# Patient Record
Sex: FEMALE | Race: WHITE | NOT HISPANIC OR LATINO | Employment: OTHER | URBAN - METROPOLITAN AREA
[De-identification: names, ages, dates, MRNs, and addresses within clinical notes are randomized per-mention and may not be internally consistent; named-entity substitution may affect disease eponyms.]

---

## 2017-01-05 ENCOUNTER — COMMUNICATION - HEALTHEAST (OUTPATIENT)
Dept: FAMILY MEDICINE | Facility: CLINIC | Age: 67
End: 2017-01-05

## 2017-01-05 ENCOUNTER — RECORDS - HEALTHEAST (OUTPATIENT)
Dept: ADMINISTRATIVE | Facility: OTHER | Age: 67
End: 2017-01-05

## 2017-01-05 DIAGNOSIS — E03.9 HYPOTHYROID: ICD-10-CM

## 2017-01-05 DIAGNOSIS — I10 UNSPECIFIED ESSENTIAL HYPERTENSION: ICD-10-CM

## 2017-01-19 ENCOUNTER — RECORDS - HEALTHEAST (OUTPATIENT)
Dept: ADMINISTRATIVE | Facility: OTHER | Age: 67
End: 2017-01-19

## 2017-01-20 ENCOUNTER — COMMUNICATION - HEALTHEAST (OUTPATIENT)
Dept: SCHEDULING | Facility: CLINIC | Age: 67
End: 2017-01-20

## 2017-01-20 DIAGNOSIS — I10 UNSPECIFIED ESSENTIAL HYPERTENSION: ICD-10-CM

## 2017-01-24 ENCOUNTER — RECORDS - HEALTHEAST (OUTPATIENT)
Dept: ADMINISTRATIVE | Facility: OTHER | Age: 67
End: 2017-01-24

## 2017-01-25 ENCOUNTER — RECORDS - HEALTHEAST (OUTPATIENT)
Dept: MAMMOGRAPHY | Facility: CLINIC | Age: 67
End: 2017-01-25

## 2017-01-25 DIAGNOSIS — Z12.31 ENCOUNTER FOR SCREENING MAMMOGRAM FOR MALIGNANT NEOPLASM OF BREAST: ICD-10-CM

## 2017-04-22 ENCOUNTER — COMMUNICATION - HEALTHEAST (OUTPATIENT)
Dept: FAMILY MEDICINE | Facility: CLINIC | Age: 67
End: 2017-04-22

## 2017-04-22 DIAGNOSIS — E03.9 HYPOTHYROID: ICD-10-CM

## 2017-04-22 DIAGNOSIS — I10 UNSPECIFIED ESSENTIAL HYPERTENSION: ICD-10-CM

## 2017-06-14 ENCOUNTER — AMBULATORY - HEALTHEAST (OUTPATIENT)
Dept: FAMILY MEDICINE | Facility: CLINIC | Age: 67
End: 2017-06-14

## 2017-06-14 ENCOUNTER — AMBULATORY - HEALTHEAST (OUTPATIENT)
Dept: LAB | Facility: CLINIC | Age: 67
End: 2017-06-14

## 2017-06-14 DIAGNOSIS — E55.9 VITAMIN D INSUFFICIENCY: ICD-10-CM

## 2017-06-14 DIAGNOSIS — E78.5 HYPERLIPIDEMIA: ICD-10-CM

## 2017-06-14 LAB
CHOLEST SERPL-MCNC: 175 MG/DL
FASTING STATUS PATIENT QL REPORTED: YES
HDLC SERPL-MCNC: 71 MG/DL
LDLC SERPL CALC-MCNC: 90 MG/DL
TRIGL SERPL-MCNC: 69 MG/DL

## 2017-06-16 ENCOUNTER — COMMUNICATION - HEALTHEAST (OUTPATIENT)
Dept: PHYSICAL MEDICINE AND REHAB | Facility: CLINIC | Age: 67
End: 2017-06-16

## 2017-06-22 ENCOUNTER — COMMUNICATION - HEALTHEAST (OUTPATIENT)
Dept: FAMILY MEDICINE | Facility: CLINIC | Age: 67
End: 2017-06-22

## 2017-06-22 DIAGNOSIS — I10 UNSPECIFIED ESSENTIAL HYPERTENSION: ICD-10-CM

## 2017-06-22 DIAGNOSIS — E03.9 HYPOTHYROID: ICD-10-CM

## 2017-06-26 ENCOUNTER — COMMUNICATION - HEALTHEAST (OUTPATIENT)
Dept: FAMILY MEDICINE | Facility: CLINIC | Age: 67
End: 2017-06-26

## 2017-06-27 ENCOUNTER — COMMUNICATION - HEALTHEAST (OUTPATIENT)
Dept: FAMILY MEDICINE | Facility: CLINIC | Age: 67
End: 2017-06-27

## 2017-06-27 ENCOUNTER — AMBULATORY - HEALTHEAST (OUTPATIENT)
Dept: FAMILY MEDICINE | Facility: CLINIC | Age: 67
End: 2017-06-27

## 2017-06-27 DIAGNOSIS — E03.9 HYPOTHYROID: ICD-10-CM

## 2017-07-06 ENCOUNTER — OFFICE VISIT - HEALTHEAST (OUTPATIENT)
Dept: PODIATRY | Facility: CLINIC | Age: 67
End: 2017-07-06

## 2017-07-06 DIAGNOSIS — G58.9 COMPRESSION NEUROPATHY: ICD-10-CM

## 2017-07-06 DIAGNOSIS — G57.50 TARSAL TUNNEL SYNDROME, UNSPECIFIED LATERALITY: ICD-10-CM

## 2017-08-09 ENCOUNTER — AMBULATORY - HEALTHEAST (OUTPATIENT)
Dept: LAB | Facility: CLINIC | Age: 67
End: 2017-08-09

## 2017-08-09 DIAGNOSIS — E03.9 HYPOTHYROID: ICD-10-CM

## 2017-09-25 ENCOUNTER — AMBULATORY - HEALTHEAST (OUTPATIENT)
Dept: FAMILY MEDICINE | Facility: CLINIC | Age: 67
End: 2017-09-25

## 2017-09-25 ENCOUNTER — COMMUNICATION - HEALTHEAST (OUTPATIENT)
Dept: FAMILY MEDICINE | Facility: CLINIC | Age: 67
End: 2017-09-25

## 2017-09-25 DIAGNOSIS — E03.9 HYPOTHYROID: ICD-10-CM

## 2017-10-22 ENCOUNTER — COMMUNICATION - HEALTHEAST (OUTPATIENT)
Dept: FAMILY MEDICINE | Facility: CLINIC | Age: 67
End: 2017-10-22

## 2017-10-24 ENCOUNTER — COMMUNICATION - HEALTHEAST (OUTPATIENT)
Dept: FAMILY MEDICINE | Facility: CLINIC | Age: 67
End: 2017-10-24

## 2017-10-24 DIAGNOSIS — I10 ESSENTIAL HYPERTENSION: ICD-10-CM

## 2017-10-26 ENCOUNTER — RECORDS - HEALTHEAST (OUTPATIENT)
Dept: ADMINISTRATIVE | Facility: OTHER | Age: 67
End: 2017-10-26

## 2017-10-29 ENCOUNTER — COMMUNICATION - HEALTHEAST (OUTPATIENT)
Dept: FAMILY MEDICINE | Facility: CLINIC | Age: 67
End: 2017-10-29

## 2017-10-29 DIAGNOSIS — I10 HYPERTENSION: ICD-10-CM

## 2017-11-13 ENCOUNTER — OFFICE VISIT - HEALTHEAST (OUTPATIENT)
Dept: FAMILY MEDICINE | Facility: CLINIC | Age: 67
End: 2017-11-13

## 2017-11-13 DIAGNOSIS — M25.542 ARTHRALGIA OF BOTH HANDS: ICD-10-CM

## 2017-11-13 DIAGNOSIS — E03.9 ACQUIRED HYPOTHYROIDISM: ICD-10-CM

## 2017-11-13 DIAGNOSIS — Z51.81 THERAPEUTIC DRUG MONITORING: ICD-10-CM

## 2017-11-13 DIAGNOSIS — M25.541 ARTHRALGIA OF BOTH HANDS: ICD-10-CM

## 2017-11-13 DIAGNOSIS — G25.81 RESTLESS LEGS SYNDROME (RLS): ICD-10-CM

## 2017-11-13 DIAGNOSIS — Z72.0 TOBACCO USE: ICD-10-CM

## 2017-11-13 ASSESSMENT — MIFFLIN-ST. JEOR: SCORE: 1343.02

## 2017-12-20 ENCOUNTER — RECORDS - HEALTHEAST (OUTPATIENT)
Dept: ADMINISTRATIVE | Facility: OTHER | Age: 67
End: 2017-12-20

## 2018-01-20 ENCOUNTER — COMMUNICATION - HEALTHEAST (OUTPATIENT)
Dept: FAMILY MEDICINE | Facility: CLINIC | Age: 68
End: 2018-01-20

## 2018-01-20 DIAGNOSIS — I10 ESSENTIAL HYPERTENSION: ICD-10-CM

## 2018-01-20 DIAGNOSIS — E78.5 HLD (HYPERLIPIDEMIA): ICD-10-CM

## 2018-01-27 ENCOUNTER — COMMUNICATION - HEALTHEAST (OUTPATIENT)
Dept: FAMILY MEDICINE | Facility: CLINIC | Age: 68
End: 2018-01-27

## 2018-01-27 DIAGNOSIS — I10 HYPERTENSION: ICD-10-CM

## 2018-02-27 ENCOUNTER — COMMUNICATION - HEALTHEAST (OUTPATIENT)
Dept: FAMILY MEDICINE | Facility: CLINIC | Age: 68
End: 2018-02-27

## 2018-02-27 ENCOUNTER — OFFICE VISIT - HEALTHEAST (OUTPATIENT)
Dept: FAMILY MEDICINE | Facility: CLINIC | Age: 68
End: 2018-02-27

## 2018-02-27 DIAGNOSIS — J40 BRONCHITIS: ICD-10-CM

## 2018-02-27 DIAGNOSIS — J10.1 INFLUENZA A: ICD-10-CM

## 2018-02-27 DIAGNOSIS — J02.9 PHARYNGITIS: ICD-10-CM

## 2018-02-27 LAB
DEPRECATED S PYO AG THROAT QL EIA: NORMAL
FLUAV AG SPEC QL IA: ABNORMAL
FLUBV AG SPEC QL IA: ABNORMAL

## 2018-02-28 LAB — GROUP A STREP BY PCR: NORMAL

## 2018-04-27 ENCOUNTER — RECORDS - HEALTHEAST (OUTPATIENT)
Dept: MAMMOGRAPHY | Facility: CLINIC | Age: 68
End: 2018-04-27

## 2018-04-27 DIAGNOSIS — Z12.31 ENCOUNTER FOR SCREENING MAMMOGRAM FOR MALIGNANT NEOPLASM OF BREAST: ICD-10-CM

## 2018-04-30 ENCOUNTER — COMMUNICATION - HEALTHEAST (OUTPATIENT)
Dept: FAMILY MEDICINE | Facility: CLINIC | Age: 68
End: 2018-04-30

## 2018-04-30 DIAGNOSIS — I10 HYPERTENSION: ICD-10-CM

## 2018-06-21 ENCOUNTER — OFFICE VISIT - HEALTHEAST (OUTPATIENT)
Dept: FAMILY MEDICINE | Facility: CLINIC | Age: 68
End: 2018-06-21

## 2018-06-21 DIAGNOSIS — Z00.00 ROUTINE GENERAL MEDICAL EXAMINATION AT A HEALTH CARE FACILITY: ICD-10-CM

## 2018-06-21 DIAGNOSIS — Z13.1 SCREENING FOR DIABETES MELLITUS: ICD-10-CM

## 2018-06-21 DIAGNOSIS — Z71.6 ENCOUNTER FOR TOBACCO USE CESSATION COUNSELING: ICD-10-CM

## 2018-06-21 DIAGNOSIS — E78.2 MIXED HYPERLIPIDEMIA: ICD-10-CM

## 2018-06-21 DIAGNOSIS — N64.52 DISCHARGE FROM RIGHT NIPPLE: ICD-10-CM
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
## 2018-06-21 DIAGNOSIS — I10 ESSENTIAL HYPERTENSION: ICD-10-CM

## 2018-06-21 DIAGNOSIS — G25.81 RESTLESS LEGS SYNDROME (RLS): ICD-10-CM

## 2018-06-21 DIAGNOSIS — Z13.0 SCREENING FOR DEFICIENCY ANEMIA: ICD-10-CM

## 2018-06-21 DIAGNOSIS — E03.9 ACQUIRED HYPOTHYROIDISM: ICD-10-CM

## 2018-06-21 LAB
ALBUMIN SERPL-MCNC: 3.7 G/DL (ref 3.5–5)
ALP SERPL-CCNC: 76 U/L (ref 45–120)
ALT SERPL W P-5'-P-CCNC: 14 U/L (ref 0–45)
ANION GAP SERPL CALCULATED.3IONS-SCNC: 7 MMOL/L (ref 5–18)
AST SERPL W P-5'-P-CCNC: 16 U/L (ref 0–40)
BILIRUB SERPL-MCNC: 0.5 MG/DL (ref 0–1)
BUN SERPL-MCNC: 20 MG/DL (ref 8–22)
CALCIUM SERPL-MCNC: 10 MG/DL (ref 8.5–10.5)
CHLORIDE BLD-SCNC: 104 MMOL/L (ref 98–107)
CHOLEST SERPL-MCNC: 192 MG/DL
CO2 SERPL-SCNC: 30 MMOL/L (ref 22–31)
CREAT SERPL-MCNC: 1.03 MG/DL (ref 0.6–1.1)
ERYTHROCYTE [DISTWIDTH] IN BLOOD BY AUTOMATED COUNT: 11.9 % (ref 11–14.5)
FASTING STATUS PATIENT QL REPORTED: YES
GFR SERPL CREATININE-BSD FRML MDRD: 53 ML/MIN/1.73M2
GLUCOSE BLD-MCNC: 93 MG/DL (ref 70–125)
HCT VFR BLD AUTO: 37.5 % (ref 35–47)
HDLC SERPL-MCNC: 74 MG/DL
HGB BLD-MCNC: 12.5 G/DL (ref 12–16)
LDLC SERPL CALC-MCNC: 101 MG/DL
MCH RBC QN AUTO: 31.5 PG (ref 27–34)
MCHC RBC AUTO-ENTMCNC: 33.5 G/DL (ref 32–36)
MCV RBC AUTO: 94 FL (ref 80–100)
PLATELET # BLD AUTO: 269 THOU/UL (ref 140–440)
PMV BLD AUTO: 6.8 FL (ref 7–10)
POTASSIUM BLD-SCNC: 4.1 MMOL/L (ref 3.5–5)
PROT SERPL-MCNC: 6.6 G/DL (ref 6–8)
RBC # BLD AUTO: 3.98 MILL/UL (ref 3.8–5.4)
SODIUM SERPL-SCNC: 141 MMOL/L (ref 136–145)
TRIGL SERPL-MCNC: 85 MG/DL
TSH SERPL DL<=0.005 MIU/L-ACNC: 1.5 UIU/ML (ref 0.3–5)
WBC: 5.4 THOU/UL (ref 4–11)

## 2018-06-21 ASSESSMENT — MIFFLIN-ST. JEOR: SCORE: 1328.96

## 2018-06-26 ENCOUNTER — HOSPITAL ENCOUNTER (OUTPATIENT)
Dept: SURGERY | Facility: CLINIC | Age: 68
Discharge: HOME | End: 2018-06-26
Attending: NURSE PRACTITIONER | Admitting: SPECIALIST

## 2018-06-26 DIAGNOSIS — N64.52 DISCHARGE FROM RIGHT NIPPLE: ICD-10-CM

## 2018-06-26 ASSESSMENT — MIFFLIN-ST. JEOR: SCORE: 1319.21

## 2018-06-29 LAB
LAB AP CHARGES (HE HISTORICAL CONVERSION): NORMAL
PATH REPORT.COMMENTS IMP SPEC: NORMAL
PATH REPORT.FINAL DX SPEC: NORMAL
PATH REPORT.GROSS SPEC: NORMAL
PATH REPORT.MICROSCOPIC SPEC OTHER STN: NORMAL
PATH REPORT.RELEVANT HX SPEC: NORMAL
RESULT FLAG (HE HISTORICAL CONVERSION): NORMAL

## 2018-07-26 ENCOUNTER — COMMUNICATION - HEALTHEAST (OUTPATIENT)
Dept: FAMILY MEDICINE | Facility: CLINIC | Age: 68
End: 2018-07-26

## 2018-07-26 DIAGNOSIS — I10 ESSENTIAL HYPERTENSION: ICD-10-CM

## 2018-07-26 DIAGNOSIS — E78.5 HLD (HYPERLIPIDEMIA): ICD-10-CM

## 2018-07-28 ENCOUNTER — COMMUNICATION - HEALTHEAST (OUTPATIENT)
Dept: FAMILY MEDICINE | Facility: CLINIC | Age: 68
End: 2018-07-28

## 2018-07-28 DIAGNOSIS — I10 HYPERTENSION: ICD-10-CM

## 2018-09-11 ENCOUNTER — OFFICE VISIT - HEALTHEAST (OUTPATIENT)
Dept: FAMILY MEDICINE | Facility: CLINIC | Age: 68
End: 2018-09-11

## 2018-09-11 DIAGNOSIS — E78.2 MIXED HYPERLIPIDEMIA: ICD-10-CM

## 2018-09-11 DIAGNOSIS — Z87.891 FORMER SMOKER: ICD-10-CM

## 2018-09-11 DIAGNOSIS — I10 ESSENTIAL HYPERTENSION: ICD-10-CM

## 2018-09-11 DIAGNOSIS — M19.079: ICD-10-CM

## 2018-09-11 DIAGNOSIS — R43.8 BAD TASTE IN MOUTH: ICD-10-CM

## 2018-09-11 DIAGNOSIS — R06.2 WHEEZE: ICD-10-CM

## 2018-09-11 DIAGNOSIS — R11.0 NAUSEA: ICD-10-CM

## 2018-09-11 DIAGNOSIS — G25.81 RESTLESS LEGS SYNDROME (RLS): ICD-10-CM

## 2018-09-11 DIAGNOSIS — E03.9 ACQUIRED HYPOTHYROIDISM: ICD-10-CM

## 2018-09-11 DIAGNOSIS — R05.9 COUGH: ICD-10-CM

## 2018-09-11 LAB
ALBUMIN SERPL-MCNC: 3.9 G/DL (ref 3.5–5)
ALP SERPL-CCNC: 84 U/L (ref 45–120)
ALT SERPL W P-5'-P-CCNC: 18 U/L (ref 0–45)
AMYLASE SERPL-CCNC: 64 U/L (ref 5–120)
AST SERPL W P-5'-P-CCNC: 17 U/L (ref 0–40)
BILIRUB DIRECT SERPL-MCNC: 0.1 MG/DL
BILIRUB SERPL-MCNC: 0.4 MG/DL (ref 0–1)
LIPASE SERPL-CCNC: 54 U/L (ref 0–52)
PROT SERPL-MCNC: 7 G/DL (ref 6–8)
TSH SERPL DL<=0.005 MIU/L-ACNC: 0.65 UIU/ML (ref 0.3–5)

## 2018-09-11 RX ORDER — ALBUTEROL SULFATE 90 UG/1
2 AEROSOL, METERED RESPIRATORY (INHALATION) EVERY 6 HOURS PRN
Qty: 1 EACH | Refills: 0 | Status: SHIPPED | OUTPATIENT
Start: 2018-09-11

## 2018-09-11 RX ORDER — GABAPENTIN 300 MG/1
CAPSULE ORAL
Qty: 120 CAPSULE | Refills: 6 | Status: SHIPPED | OUTPATIENT
Start: 2018-09-11

## 2018-09-11 ASSESSMENT — MIFFLIN-ST. JEOR: SCORE: 1302.88

## 2018-09-17 ENCOUNTER — COMMUNICATION - HEALTHEAST (OUTPATIENT)
Dept: FAMILY MEDICINE | Facility: CLINIC | Age: 68
End: 2018-09-17

## 2018-09-20 ENCOUNTER — COMMUNICATION - HEALTHEAST (OUTPATIENT)
Dept: FAMILY MEDICINE | Facility: CLINIC | Age: 68
End: 2018-09-20

## 2018-10-16 ENCOUNTER — COMMUNICATION - HEALTHEAST (OUTPATIENT)
Dept: FAMILY MEDICINE | Facility: CLINIC | Age: 68
End: 2018-10-16

## 2018-10-16 DIAGNOSIS — E03.9 HYPOTHYROIDISM, UNSPECIFIED TYPE: ICD-10-CM

## 2018-10-16 RX ORDER — LEVOTHYROXINE SODIUM 100 UG/1
100 TABLET ORAL DAILY
Qty: 90 TABLET | Refills: 3 | Status: SHIPPED | OUTPATIENT
Start: 2018-10-16

## 2018-10-19 ENCOUNTER — OFFICE VISIT - HEALTHEAST (OUTPATIENT)
Dept: FAMILY MEDICINE | Facility: CLINIC | Age: 68
End: 2018-10-19

## 2018-10-19 DIAGNOSIS — J98.01 ACUTE BRONCHOSPASM: ICD-10-CM

## 2018-10-22 ENCOUNTER — COMMUNICATION - HEALTHEAST (OUTPATIENT)
Dept: FAMILY MEDICINE | Facility: CLINIC | Age: 68
End: 2018-10-22

## 2018-10-27 ENCOUNTER — COMMUNICATION - HEALTHEAST (OUTPATIENT)
Dept: FAMILY MEDICINE | Facility: CLINIC | Age: 68
End: 2018-10-27

## 2018-12-08 ENCOUNTER — COMMUNICATION - HEALTHEAST (OUTPATIENT)
Dept: FAMILY MEDICINE | Facility: CLINIC | Age: 68
End: 2018-12-08

## 2018-12-10 RX ORDER — BECLOMETHASONE DIPROPIONATE HFA 80 UG/1
AEROSOL, METERED RESPIRATORY (INHALATION)
Qty: 10.6 G | Refills: 0 | Status: SHIPPED | OUTPATIENT
Start: 2018-12-10

## 2019-01-24 ENCOUNTER — OFFICE VISIT - HEALTHEAST (OUTPATIENT)
Dept: FAMILY MEDICINE | Facility: CLINIC | Age: 69
End: 2019-01-24

## 2019-01-24 DIAGNOSIS — M19.071 PRIMARY OSTEOARTHRITIS OF RIGHT FOOT: ICD-10-CM

## 2019-01-24 DIAGNOSIS — G25.81 RESTLESS LEGS SYNDROME (RLS): ICD-10-CM

## 2019-01-24 NOTE — ASSESSMENT & PLAN NOTE
Previously prescribed Mirapex by [facility] and dose increased  States that this was not helpful  Now is on gabapentin, she understands for restless legs  Still having symptoms    I suggested that her symptoms sound most compatible with RLS rather than neuropathy (apparently borderline EMG) and that bilateral, symmetric, foot symptoms would be unlikely from lumbar spine disease, though not impossible    She has diminished reflexes which would seem to make upper motor neuron problem very unlikely    I think a trial of alternative Parkinson agent is appropriate, discussed this at length, start ropinirole 0.25 mg, go to 5 mg after a few days.  Discussed that with this medication, unlike gabapentin, and she could use it as needed    In the meantime, since it seems it is not helping anything, she should gradually wean off gabapentin over 1 month.  Patient expressed understanding

## 2019-02-03 ENCOUNTER — COMMUNICATION - HEALTHEAST (OUTPATIENT)
Dept: FAMILY MEDICINE | Facility: CLINIC | Age: 69
End: 2019-02-03

## 2019-02-03 DIAGNOSIS — Z71.6 ENCOUNTER FOR TOBACCO USE CESSATION COUNSELING: ICD-10-CM

## 2019-02-06 RX ORDER — VARENICLINE TARTRATE 1 MG/1
TABLET, FILM COATED ORAL
Qty: 112 TABLET | Refills: 2 | Status: SHIPPED | OUTPATIENT
Start: 2019-02-06

## 2019-02-18 ENCOUNTER — COMMUNICATION - HEALTHEAST (OUTPATIENT)
Dept: FAMILY MEDICINE | Facility: CLINIC | Age: 69
End: 2019-02-18

## 2019-03-29 ENCOUNTER — COMMUNICATION - HEALTHEAST (OUTPATIENT)
Dept: FAMILY MEDICINE | Facility: CLINIC | Age: 69
End: 2019-03-29

## 2019-03-29 DIAGNOSIS — G25.81 RESTLESS LEGS SYNDROME (RLS): ICD-10-CM

## 2019-05-23 ENCOUNTER — COMMUNICATION - HEALTHEAST (OUTPATIENT)
Dept: FAMILY MEDICINE | Facility: CLINIC | Age: 69
End: 2019-05-23

## 2019-06-29 ENCOUNTER — COMMUNICATION - HEALTHEAST (OUTPATIENT)
Dept: FAMILY MEDICINE | Facility: CLINIC | Age: 69
End: 2019-06-29

## 2019-06-29 DIAGNOSIS — G25.81 RESTLESS LEGS SYNDROME (RLS): ICD-10-CM

## 2019-06-29 RX ORDER — ROPINIROLE 0.25 MG/1
TABLET, FILM COATED ORAL
Qty: 180 TABLET | Refills: 1 | Status: SHIPPED | OUTPATIENT
Start: 2019-06-29

## 2019-07-24 ENCOUNTER — COMMUNICATION - HEALTHEAST (OUTPATIENT)
Dept: FAMILY MEDICINE | Facility: CLINIC | Age: 69
End: 2019-07-24

## 2019-07-24 DIAGNOSIS — I10 ESSENTIAL HYPERTENSION: ICD-10-CM

## 2019-07-24 DIAGNOSIS — E78.5 HLD (HYPERLIPIDEMIA): ICD-10-CM

## 2019-07-29 ENCOUNTER — COMMUNICATION - HEALTHEAST (OUTPATIENT)
Dept: FAMILY MEDICINE | Facility: CLINIC | Age: 69
End: 2019-07-29

## 2019-07-29 DIAGNOSIS — I10 HYPERTENSION: ICD-10-CM

## 2019-07-30 RX ORDER — HYDROCHLOROTHIAZIDE 25 MG/1
25 TABLET ORAL DAILY
Qty: 90 TABLET | Refills: 0 | Status: SHIPPED | OUTPATIENT
Start: 2019-07-30

## 2019-10-19 ENCOUNTER — COMMUNICATION - HEALTHEAST (OUTPATIENT)
Dept: FAMILY MEDICINE | Facility: CLINIC | Age: 69
End: 2019-10-19

## 2019-10-19 DIAGNOSIS — E03.9 HYPOTHYROIDISM, UNSPECIFIED TYPE: ICD-10-CM

## 2019-10-21 ENCOUNTER — COMMUNICATION - HEALTHEAST (OUTPATIENT)
Dept: FAMILY MEDICINE | Facility: CLINIC | Age: 69
End: 2019-10-21

## 2019-10-21 DIAGNOSIS — I10 ESSENTIAL HYPERTENSION: ICD-10-CM

## 2020-01-13 ENCOUNTER — COMMUNICATION - HEALTHEAST (OUTPATIENT)
Dept: FAMILY MEDICINE | Facility: CLINIC | Age: 70
End: 2020-01-13

## 2020-01-13 DIAGNOSIS — E78.5 HLD (HYPERLIPIDEMIA): ICD-10-CM

## 2020-01-13 DIAGNOSIS — I10 ESSENTIAL HYPERTENSION: ICD-10-CM

## 2020-01-17 ENCOUNTER — RECORDS - HEALTHEAST (OUTPATIENT)
Dept: MAMMOGRAPHY | Facility: CLINIC | Age: 70
End: 2020-01-17

## 2020-01-17 DIAGNOSIS — Z12.31 ENCOUNTER FOR SCREENING MAMMOGRAM FOR MALIGNANT NEOPLASM OF BREAST: ICD-10-CM

## 2020-03-27 ENCOUNTER — COMMUNICATION - HEALTHEAST (OUTPATIENT)
Dept: FAMILY MEDICINE | Facility: CLINIC | Age: 70
End: 2020-03-27

## 2020-03-27 DIAGNOSIS — G25.81 RESTLESS LEGS SYNDROME (RLS): ICD-10-CM

## 2020-07-08 ENCOUNTER — COMMUNICATION - HEALTHEAST (OUTPATIENT)
Dept: FAMILY MEDICINE | Facility: CLINIC | Age: 70
End: 2020-07-08

## 2020-07-08 DIAGNOSIS — I10 ESSENTIAL HYPERTENSION: ICD-10-CM

## 2020-07-08 DIAGNOSIS — E78.5 HLD (HYPERLIPIDEMIA): ICD-10-CM

## 2020-07-09 ENCOUNTER — COMMUNICATION - HEALTHEAST (OUTPATIENT)
Dept: FAMILY MEDICINE | Facility: CLINIC | Age: 70
End: 2020-07-09

## 2021-05-24 ENCOUNTER — RECORDS - HEALTHEAST (OUTPATIENT)
Dept: ADMINISTRATIVE | Facility: CLINIC | Age: 71
End: 2021-05-24

## 2021-05-25 ENCOUNTER — RECORDS - HEALTHEAST (OUTPATIENT)
Dept: ADMINISTRATIVE | Facility: CLINIC | Age: 71
End: 2021-05-25

## 2021-05-27 ENCOUNTER — RECORDS - HEALTHEAST (OUTPATIENT)
Dept: ADMINISTRATIVE | Facility: CLINIC | Age: 71
End: 2021-05-27

## 2021-05-27 NOTE — TELEPHONE ENCOUNTER
Refill Approved    Rx renewed per Medication Renewal Policy. Medication was last renewed on 01/24/2019, last office visit 1/24/2019.    Danika Landis, [facility] Connection Triage/Med Refill 3/29/2019     Requested Prescriptions   Pending Prescriptions Disp Refills     rOPINIRole (REQUIP) 0.25 MG tablet [Pharmacy Med Name: ROPINIROLE HCL 0.25 MG TABLET] 60 tablet 1     Sig: TAKE 1 TABLET BY MOUTH NIGHTLY FOR 3 DAYS, THEN 2 TABLETS BY MOUTH AT SLEEP TIME AS NEEDED    Parkinson's Meds I Refill Protocol Passed - 3/29/2019  3:39 AM       Passed - PCP or prescribing provider visit in past 6 months     Last office visit with prescriber/PCP: 1/24/2019 OR same dept: 1/24/2019 Edgardo Hamm MD OR same specialty: 1/24/2019 Edgardo Hamm MD Last physical: Visit date not found Last MTM visit: Visit date not found     Next appt within 3 mo: Visit date not found  Next physical within 3 mo: Visit date not found  Prescriber OR PCP: Edgardo Hamm MD  Last diagnosis associated with med order: There are no diagnoses linked to this encounter.  If protocol passes may refill for 6 months if within 3 months of last provider visit (or a total of 9 months).

## 2021-05-28 ENCOUNTER — RECORDS - HEALTHEAST (OUTPATIENT)
Dept: ADMINISTRATIVE | Facility: CLINIC | Age: 71
End: 2021-05-28

## 2021-05-29 ENCOUNTER — RECORDS - HEALTHEAST (OUTPATIENT)
Dept: ADMINISTRATIVE | Facility: CLINIC | Age: 71
End: 2021-05-29

## 2021-05-29 NOTE — TELEPHONE ENCOUNTER
Called patient to schedule an appt for a follow up with HTN. Spoke with pt. Patient states she is switching PCP, and have not found a doctor she is comfortable with at the moment. She states she is still looking. No appt is scheduled with anyone.

## 2021-05-30 ENCOUNTER — RECORDS - HEALTHEAST (OUTPATIENT)
Dept: ADMINISTRATIVE | Facility: CLINIC | Age: 71
End: 2021-05-30

## 2021-05-30 NOTE — TELEPHONE ENCOUNTER
RN cannot approve Refill Request    RN can NOT refill this medication Protocol failed and NO refill given.       Marybeth Johnston, [facility] Triage/Med Refill 7/24/2019    Requested Prescriptions   Pending Prescriptions Disp Refills     lisinopril (PRINIVIL,ZESTRIL) 40 MG tablet [Pharmacy Med Name: LISINOPRIL 40 MG TABLET] 90 tablet 3     Sig: TAKE 1 TABLET BY MOUTH EVERY DAY       Ace Inhibitors Refill Protocol Failed - 7/24/2019  1:38 AM        Failed - Serum Potassium in past 12 months     No results found for: LN-POTASSIUM          Failed - Serum Creatinine in past 12 months     Creatinine   Date Value Ref Range Status   06/21/2018 1.03 0.60 - 1.10 mg/dL Final             Passed - PCP or prescribing provider visit in past 12 months       Last office visit with prescriber/PCP: Visit date not found OR same dept: 10/19/2018 Almaz Raygoza MD OR same specialty: 1/24/2019 Edgardo Hamm MD  Last physical: 6/21/2018 Last MTM visit: Visit date not found   Next visit within 3 mo: Visit date not found  Next physical within 3 mo: Visit date not found  Prescriber OR PCP: Annita Patton CNP  Last diagnosis associated with med order: 1. HLD (hyperlipidemia)  - atorvastatin (LIPITOR) 40 MG tablet; TAKE 1 TABLET BY MOUTH EVERY DAY  Dispense: 90 tablet; Refill: 1    2. Essential hypertension  - lisinopril (PRINIVIL,ZESTRIL) 40 MG tablet [Pharmacy Med Name: LISINOPRIL 40 MG TABLET]; TAKE 1 TABLET BY MOUTH EVERY DAY  Dispense: 90 tablet; Refill: 3    If protocol passes may refill for 12 months if within 3 months of last provider visit (or a total of 15 months).             Passed - Blood pressure filed in past 12 months     BP Readings from Last 1 Encounters:   01/24/19 108/68           Signed Prescriptions Disp Refills    atorvastatin (LIPITOR) 40 MG tablet 90 tablet 1     Sig: TAKE 1 TABLET BY MOUTH EVERY DAY       Statins Refill Protocol (Hmg CoA Reductase Inhibitors) Passed - 7/24/2019  1:38 AM         Passed - PCP or prescribing provider visit in past 12 months      Last office visit with prescriber/PCP: Visit date not found OR same dept: 10/19/2018 Almaz Raygoza MD OR same specialty: 1/24/2019 Edgardo Hamm MD  Last physical: 6/21/2018 Last MTM visit: Visit date not found   Next visit within 3 mo: Visit date not found  Next physical within 3 mo: Visit date not found  Prescriber OR PCP: Annita Patton CNP  Last diagnosis associated with med order: 1. HLD (hyperlipidemia)  - atorvastatin (LIPITOR) 40 MG tablet; TAKE 1 TABLET BY MOUTH EVERY DAY  Dispense: 90 tablet; Refill: 1    2. Essential hypertension  - lisinopril (PRINIVIL,ZESTRIL) 40 MG tablet [Pharmacy Med Name: LISINOPRIL 40 MG TABLET]; TAKE 1 TABLET BY MOUTH EVERY DAY  Dispense: 90 tablet; Refill: 3    If protocol passes may refill for 12 months if within 3 months of last provider visit (or a total of 15 months).

## 2021-05-30 NOTE — TELEPHONE ENCOUNTER
Refill Approved    Rx renewed per Medication Renewal Policy. Medication was last renewed on 3/29/19.    Marybeth Johnston, [facility] Connection Triage/Med Refill 6/29/2019     Requested Prescriptions   Pending Prescriptions Disp Refills     rOPINIRole (REQUIP) 0.25 MG tablet [Pharmacy Med Name: ROPINIROLE HCL 0.25 MG TABLET] 90 tablet 1     Sig: TAKE 1 TABLET BY MOUTH NIGHTLY FOR 3 DAYS, THEN 2 TABLETS BY MOUTH AT SLEEP TIME AS NEEDED       Parkinson's Meds I Refill Protocol Passed - 6/29/2019  8:27 AM        Passed - PCP or prescribing provider visit in past 6 months      Last office visit with prescriber/PCP: 1/24/2019 OR same dept: 1/24/2019 Edgardo Hamm MD OR same specialty: 1/24/2019 Edgardo Hamm MD Last physical: Visit date not found Last MTM visit: Visit date not found     Next appt within 3 mo: Visit date not found  Next physical within 3 mo: Visit date not found  Prescriber OR PCP: Edgardo Hamm MD  Last diagnosis associated with med order: 1. Restless legs syndrome (RLS)  - rOPINIRole (REQUIP) 0.25 MG tablet [Pharmacy Med Name: ROPINIROLE HCL 0.25 MG TABLET]; TAKE 1 TABLET BY MOUTH NIGHTLY FOR 3 DAYS, THEN 2 TABLETS BY MOUTH AT SLEEP TIME AS NEEDED  Dispense: 90 tablet; Refill: 1    If protocol passes may refill for 6 months if within 3 months of last provider visit (or a total of 9 months).

## 2021-05-30 NOTE — TELEPHONE ENCOUNTER
RN cannot approve Refill Request    RN can NOT refill this medication PCP messaged that patient is overdue for Labs. Last office visit: 9/11/2018 Jerri Cain MD Last Physical: Visit date not found Last MTM visit: Visit date not found Last visit same specialty: 1/24/2019 Edgardo Hamm MD.  Next visit within 3 mo: Visit date not found  Next physical within 3 mo: Visit date not found      Mary Barroso, [facility] Triage/Med Refill 7/29/2019    Requested Prescriptions   Pending Prescriptions Disp Refills     hydroCHLOROthiazide (HYDRODIURIL) 25 MG tablet 90 tablet 3     Sig: Take 1 tablet (25 mg total) by mouth daily.       Diuretics/Combination Diuretics Refill Protocol  Failed - 7/29/2019 11:24 AM        Failed - Serum Potassium in past 12 months      No results found for: LN-POTASSIUM          Failed - Serum Sodium in past 12 months      No results found for: LN-SODIUM          Failed - Serum Creatinine in past 12 months      Creatinine   Date Value Ref Range Status   06/21/2018 1.03 0.60 - 1.10 mg/dL Final             Passed - Visit with PCP or prescribing provider visit in past 12 months     Last office visit with prescriber/PCP: 9/11/2018 Jerri Cain MD OR same dept: 9/11/2018 Jerri Cain MD OR same specialty: 1/24/2019 Edgardo Hamm MD  Last physical: Visit date not found Last MTM visit: Visit date not found   Next visit within 3 mo: Visit date not found  Next physical within 3 mo: Visit date not found  Prescriber OR PCP: Jerri Cain MD  Last diagnosis associated with med order: 1. Hypertension  - hydroCHLOROthiazide (HYDRODIURIL) 25 MG tablet; Take 1 tablet (25 mg total) by mouth daily.  Dispense: 90 tablet; Refill: 3    If protocol passes may refill for 12 months if within 3 months of last provider visit (or a total of 15 months).             Passed - Blood pressure on file in past 12 months     BP Readings from Last 1 Encounters:   01/24/19 108/68

## 2021-05-30 NOTE — TELEPHONE ENCOUNTER
Refill Approved    Rx renewed per Medication Renewal Policy. Medication was last renewed on 7/26/18.    Marybeth Johnston, [facility] Triage/Med Refill 7/24/2019     Requested Prescriptions   Pending Prescriptions Disp Refills     atorvastatin (LIPITOR) 40 MG tablet [Pharmacy Med Name: ATORVASTATIN 40 MG TABLET] 90 tablet 3     Sig: TAKE 1 TABLET BY MOUTH EVERY DAY       Statins Refill Protocol (Hmg CoA Reductase Inhibitors) Passed - 7/24/2019  1:38 AM        Passed - PCP or prescribing provider visit in past 12 months      Last office visit with prescriber/PCP: Visit date not found OR same dept: 10/19/2018 Almaz Raygoza MD OR same specialty: 1/24/2019 Edgardo Hamm MD  Last physical: 6/21/2018 Last MTM visit: Visit date not found   Next visit within 3 mo: Visit date not found  Next physical within 3 mo: Visit date not found  Prescriber OR PCP: Annita Patton CNP  Last diagnosis associated with med order: 1. HLD (hyperlipidemia)  - atorvastatin (LIPITOR) 40 MG tablet [Pharmacy Med Name: ATORVASTATIN 40 MG TABLET]; TAKE 1 TABLET BY MOUTH EVERY DAY  Dispense: 90 tablet; Refill: 3    2. Essential hypertension  - lisinopril (PRINIVIL,ZESTRIL) 40 MG tablet [Pharmacy Med Name: LISINOPRIL 40 MG TABLET]; TAKE 1 TABLET BY MOUTH EVERY DAY  Dispense: 90 tablet; Refill: 3    If protocol passes may refill for 12 months if within 3 months of last provider visit (or a total of 15 months).             lisinopril (PRINIVIL,ZESTRIL) 40 MG tablet [Pharmacy Med Name: LISINOPRIL 40 MG TABLET] 90 tablet 3     Sig: TAKE 1 TABLET BY MOUTH EVERY DAY       Ace Inhibitors Refill Protocol Failed - 7/24/2019  1:38 AM        Failed - Serum Potassium in past 12 months     No results found for: LN-POTASSIUM          Failed - Serum Creatinine in past 12 months     Creatinine   Date Value Ref Range Status   06/21/2018 1.03 0.60 - 1.10 mg/dL Final             Passed - PCP or prescribing provider visit in past 12 months        Last office visit with prescriber/PCP: Visit date not found OR same dept: 10/19/2018 Almaz Raygoza MD OR same specialty: 1/24/2019 Edgardo Hamm MD  Last physical: 6/21/2018 Last MTM visit: Visit date not found   Next visit within 3 mo: Visit date not found  Next physical within 3 mo: Visit date not found  Prescriber OR PCP: Annita Patton CNP  Last diagnosis associated with med order: 1. HLD (hyperlipidemia)  - atorvastatin (LIPITOR) 40 MG tablet [Pharmacy Med Name: ATORVASTATIN 40 MG TABLET]; TAKE 1 TABLET BY MOUTH EVERY DAY  Dispense: 90 tablet; Refill: 3    2. Essential hypertension  - lisinopril (PRINIVIL,ZESTRIL) 40 MG tablet [Pharmacy Med Name: LISINOPRIL 40 MG TABLET]; TAKE 1 TABLET BY MOUTH EVERY DAY  Dispense: 90 tablet; Refill: 3    If protocol passes may refill for 12 months if within 3 months of last provider visit (or a total of 15 months).             Passed - Blood pressure filed in past 12 months     BP Readings from Last 1 Encounters:   01/24/19 108/68

## 2021-05-31 ENCOUNTER — RECORDS - HEALTHEAST (OUTPATIENT)
Dept: ADMINISTRATIVE | Facility: CLINIC | Age: 71
End: 2021-05-31

## 2021-05-31 VITALS — WEIGHT: 176 LBS | BODY MASS INDEX: 27.62 KG/M2 | HEIGHT: 67 IN

## 2021-06-01 ENCOUNTER — RECORDS - HEALTHEAST (OUTPATIENT)
Dept: ADMINISTRATIVE | Facility: CLINIC | Age: 71
End: 2021-06-01

## 2021-06-01 VITALS — BODY MASS INDEX: 28.35 KG/M2 | WEIGHT: 176.4 LBS | HEIGHT: 66 IN

## 2021-06-01 VITALS — HEIGHT: 65 IN | WEIGHT: 176 LBS | BODY MASS INDEX: 29.32 KG/M2

## 2021-06-01 VITALS — BODY MASS INDEX: 28.14 KG/M2 | WEIGHT: 177 LBS

## 2021-06-02 ENCOUNTER — RECORDS - HEALTHEAST (OUTPATIENT)
Dept: ADMINISTRATIVE | Facility: CLINIC | Age: 71
End: 2021-06-02

## 2021-06-02 VITALS — BODY MASS INDEX: 28.71 KG/M2 | WEIGHT: 172.5 LBS

## 2021-06-02 VITALS — BODY MASS INDEX: 28.72 KG/M2 | HEIGHT: 65 IN | WEIGHT: 172.4 LBS

## 2021-06-02 VITALS — BODY MASS INDEX: 28.87 KG/M2 | WEIGHT: 173.5 LBS

## 2021-06-02 NOTE — TELEPHONE ENCOUNTER
RN cannot approve Refill Request    RN can NOT refill this medication Protocol failed and NO refill given. Last office visit: 9/11/2018 Jerri Cain MD Last Physical: Visit date not found Last MTM visit: Visit date not found Last visit same specialty: 1/24/2019 Edgardo Hamm MD.  Next visit within 3 mo: Visit date not found  Next physical within 3 mo: Visit date not found      Ivania Gisbon, [facility] Triage/Med Refill 10/19/2019    Requested Prescriptions   Pending Prescriptions Disp Refills     levothyroxine (SYNTHROID, LEVOTHROID) 100 MCG tablet [Pharmacy Med Name: LEVOTHYROXINE 100 MCG TABLET] 90 tablet 3     Sig: TAKE 1 TABLET BY MOUTH EVERY DAY       Thyroid Hormones Protocol Failed - 10/19/2019  8:45 AM        Failed - Provider visit in past 12 months or next 3 months     Last office visit with prescriber/PCP: 9/11/2018 Jerri Cain MD OR same dept: 10/19/2018 Almaz Raygoza MD OR same specialty: 1/24/2019 Edgardo Hamm MD  Last physical: Visit date not found Last MTM visit: Visit date not found   Next visit within 3 mo: Visit date not found  Next physical within 3 mo: Visit date not found  Prescriber OR PCP: Jerri Cain MD  Last diagnosis associated with med order: 1. Hypothyroidism, unspecified type  - levothyroxine (SYNTHROID, LEVOTHROID) 100 MCG tablet [Pharmacy Med Name: LEVOTHYROXINE 100 MCG TABLET]; TAKE 1 TABLET BY MOUTH EVERY DAY  Dispense: 90 tablet; Refill: 3    If protocol passes may refill for 12 months if within 3 months of last provider visit (or a total of 15 months).             Failed - TSH on file in past 12 months for patient age 12 & older     TSH   Date Value Ref Range Status   09/11/2018 0.65 0.30 - 5.00 uIU/mL Final

## 2021-06-02 NOTE — TELEPHONE ENCOUNTER
RN cannot approve Refill Request    RN can NOT refill this medication Protocol failed and NO refill given.        Marybeth Johnston, [facility] Triage/Med Refill 10/21/2019    Requested Prescriptions   Pending Prescriptions Disp Refills     lisinopril (PRINIVIL,ZESTRIL) 40 MG tablet [Pharmacy Med Name: LISINOPRIL 40 MG TABLET] 90 tablet 3     Sig: TAKE 1 TABLET BY MOUTH EVERY DAY       Ace Inhibitors Refill Protocol Failed - 10/21/2019  1:54 AM        Failed - PCP or prescribing provider visit in past 12 months       Last office visit with prescriber/PCP: 9/11/2018 Jerri Cain MD OR same dept: Visit date not found OR same specialty: 1/24/2019 Edgardo Hamm MD  Last physical: Visit date not found Last MTM visit: Visit date not found   Next visit within 3 mo: Visit date not found  Next physical within 3 mo: Visit date not found  Prescriber OR PCP: Jerri Cain MD  Last diagnosis associated with med order: 1. Essential hypertension  - lisinopril (PRINIVIL,ZESTRIL) 40 MG tablet [Pharmacy Med Name: LISINOPRIL 40 MG TABLET]; TAKE 1 TABLET BY MOUTH EVERY DAY  Dispense: 90 tablet; Refill: 0    If protocol passes may refill for 12 months if within 3 months of last provider visit (or a total of 15 months).             Failed - Serum Potassium in past 12 months     No results found for: LN-POTASSIUM          Failed - Serum Creatinine in past 12 months     Creatinine   Date Value Ref Range Status   06/21/2018 1.03 0.60 - 1.10 mg/dL Final             Passed - Blood pressure filed in past 12 months     BP Readings from Last 1 Encounters:   01/24/19 108/68

## 2021-06-05 NOTE — TELEPHONE ENCOUNTER
RN cannot approve Refill Request    RN can NOT refill this medication PCP messaged that patient is overdue for Labs and Office Visit and Protocol failed and RN gave three month refill   . Last office visit: 9/11/2018 Jerri Cain MD Last Physical: Visit date not found Last MTM visit: Visit date not found Last visit same specialty: 1/24/2019 Edgardo Hamm MD.  Next visit within 3 mo: Visit date not found  Next physical within 3 mo: Visit date not found  Last OV 1/24/2019  Last refill of:  Lisinopril 10/21/2019 for 90/0;   Atorvastatin 7/24/2019 for 90/1     Itzel Block, [facility] Triage/Med Refill 1/15/2020    Requested Prescriptions   Pending Prescriptions Disp Refills     lisinopril (PRINIVIL,ZESTRIL) 40 MG tablet [Pharmacy Med Name: LISINOPRIL 40 MG TABLET] 90 tablet 0     Sig: TAKE 1 TABLET BY MOUTH EVERY DAY       Ace Inhibitors Refill Protocol Failed - 1/13/2020 10:14 AM        Failed - PCP or prescribing provider visit in past 12 months       Last office visit with prescriber/PCP: 9/11/2018 Jerri Cain MD OR same dept: Visit date not found OR same specialty: 1/24/2019 Edgardo Hamm MD  Last physical: Visit date not found Last MTM visit: Visit date not found   Next visit within 3 mo: Visit date not found  Next physical within 3 mo: Visit date not found  Prescriber OR PCP: Jerri Cain MD  Last diagnosis associated with med order: 1. Essential hypertension  - lisinopril (PRINIVIL,ZESTRIL) 40 MG tablet [Pharmacy Med Name: LISINOPRIL 40 MG TABLET]; TAKE 1 TABLET BY MOUTH EVERY DAY  Dispense: 90 tablet; Refill: 0    2. HLD (hyperlipidemia)  - atorvastatin (LIPITOR) 40 MG tablet [Pharmacy Med Name: ATORVASTATIN 40 MG TABLET]; TAKE 1 TABLET BY MOUTH EVERY DAY  Dispense: 90 tablet; Refill: 1    If protocol passes may refill for 12 months if within 3 months of last provider visit (or a total of 15 months).             Failed - Serum Potassium in past 12 months     No  results found for: LN-POTASSIUM          Failed - Serum Creatinine in past 12 months     Creatinine   Date Value Ref Range Status   06/21/2018 1.03 0.60 - 1.10 mg/dL Final             Passed - Blood pressure filed in past 12 months     BP Readings from Last 1 Encounters:   01/24/19 108/68             atorvastatin (LIPITOR) 40 MG tablet [Pharmacy Med Name: ATORVASTATIN 40 MG TABLET] 90 tablet 1     Sig: TAKE 1 TABLET BY MOUTH EVERY DAY       Statins Refill Protocol (Hmg CoA Reductase Inhibitors) Failed - 1/13/2020 10:14 AM        Failed - PCP or prescribing provider visit in past 12 months      Last office visit with prescriber/PCP: 9/11/2018 Jerri Cain MD OR same dept: Visit date not found OR same specialty: 1/24/2019 Edgardo Hamm MD  Last physical: Visit date not found Last MTM visit: Visit date not found   Next visit within 3 mo: Visit date not found  Next physical within 3 mo: Visit date not found  Prescriber OR PCP: Jerri Cain MD  Last diagnosis associated with med order: 1. Essential hypertension  - lisinopril (PRINIVIL,ZESTRIL) 40 MG tablet [Pharmacy Med Name: LISINOPRIL 40 MG TABLET]; TAKE 1 TABLET BY MOUTH EVERY DAY  Dispense: 90 tablet; Refill: 0    2. HLD (hyperlipidemia)  - atorvastatin (LIPITOR) 40 MG tablet [Pharmacy Med Name: ATORVASTATIN 40 MG TABLET]; TAKE 1 TABLET BY MOUTH EVERY DAY  Dispense: 90 tablet; Refill: 1    If protocol passes may refill for 12 months if within 3 months of last provider visit (or a total of 15 months).

## 2021-06-09 NOTE — TELEPHONE ENCOUNTER
Patient sent [employer] message yesterday saying she has a new PCP who will now prescribe these meds so she does NOT want me to refill them.

## 2021-06-09 NOTE — TELEPHONE ENCOUNTER
RN cannot approve Refill Request    RN can NOT refill this medication Protocol failed and NO refill given. Last office visit: 9/11/2018 Jerri Cain MD Last Physical: Visit date not found Last MTM visit: Visit date not found Last visit same specialty: 1/24/2019 Edgardo Hamm MD.  Next visit within 3 mo: Visit date not found  Next physical within 3 mo: Visit date not found      Marybeth Johnston, [facility] Connection Triage/Med Refill 7/10/2020    Requested Prescriptions   Pending Prescriptions Disp Refills     atorvastatin (LIPITOR) 40 MG tablet [Pharmacy Med Name: ATORVASTATIN 40 MG TABLET] 90 tablet 1     Sig: TAKE 1 TABLET BY MOUTH ONCE DAILY. OVERDUE FOR OFFICE VISIT/LABS       Statins Refill Protocol (Hmg CoA Reductase Inhibitors) Failed - 7/8/2020  1:09 AM        Failed - PCP or prescribing provider visit in past 12 months      Last office visit with prescriber/PCP: 9/11/2018 Jerri Cain MD OR same dept: Visit date not found OR same specialty: 1/24/2019 Edgardo Hamm MD  Last physical: Visit date not found Last MTM visit: Visit date not found   Next visit within 3 mo: Visit date not found  Next physical within 3 mo: Visit date not found  Prescriber OR PCP: Jerri Cain MD  Last diagnosis associated with med order: 1. HLD (hyperlipidemia)  - atorvastatin (LIPITOR) 40 MG tablet [Pharmacy Med Name: ATORVASTATIN 40 MG TABLET]; TAKE 1 TABLET BY MOUTH ONCE DAILY. OVERDUE FOR OFFICE VISIT/LABS  Dispense: 90 tablet; Refill: 1    2. Essential hypertension  - lisinopriL (PRINIVIL,ZESTRIL) 40 MG tablet [Pharmacy Med Name: LISINOPRIL 40 MG TABLET]; TAKE 1 TABLET BY MOUTH ONCE DAILY. CALL 24/7 TO MAKE APPOINTMENT VISIT & LABS  Dispense: 90 tablet; Refill: 1    If protocol passes may refill for 12 months if within 3 months of last provider visit (or a total of 15 months).                lisinopriL (PRINIVIL,ZESTRIL) 40 MG tablet [Pharmacy Med Name: LISINOPRIL 40 MG TABLET] 90 tablet 1      Sig: TAKE 1 TABLET BY MOUTH ONCE DAILY. CALL 24/7 TO MAKE APPOINTMENT VISIT & LABS       Ace Inhibitors Refill Protocol Failed - 7/8/2020  1:09 AM        Failed - PCP or prescribing provider visit in past 12 months       Last office visit with prescriber/PCP: 9/11/2018 Jerri Cain MD OR same dept: Visit date not found OR same specialty: 1/24/2019 Edgardo Hamm MD  Last physical: Visit date not found Last MTM visit: Visit date not found   Next visit within 3 mo: Visit date not found  Next physical within 3 mo: Visit date not found  Prescriber OR PCP: Jerri Cain MD  Last diagnosis associated with med order: 1. HLD (hyperlipidemia)  - atorvastatin (LIPITOR) 40 MG tablet [Pharmacy Med Name: ATORVASTATIN 40 MG TABLET]; TAKE 1 TABLET BY MOUTH ONCE DAILY. OVERDUE FOR OFFICE VISIT/LABS  Dispense: 90 tablet; Refill: 1    2. Essential hypertension  - lisinopriL (PRINIVIL,ZESTRIL) 40 MG tablet [Pharmacy Med Name: LISINOPRIL 40 MG TABLET]; TAKE 1 TABLET BY MOUTH ONCE DAILY. CALL 24/7 TO MAKE APPOINTMENT VISIT & LABS  Dispense: 90 tablet; Refill: 1    If protocol passes may refill for 12 months if within 3 months of last provider visit (or a total of 15 months).             Failed - Serum Potassium in past 12 months     No results found for: LN-POTASSIUM          Failed - Blood pressure filed in past 12 months     BP Readings from Last 1 Encounters:   01/24/19 108/68             Failed - Serum Creatinine in past 12 months     Creatinine   Date Value Ref Range Status   06/21/2018 1.03 0.60 - 1.10 mg/dL Final

## 2021-06-11 NOTE — PROGRESS NOTES
Subjective findings: The patient presented to the clinic today complaining of tingling and some numbness involving both lower extremities in both feet.  She indicates that she has had this for several years but over the past month it has progressed.  [facility] and to neurology.  During her visit with neurology she had several blood test as well as EMGs.  She was told that she did have some signs of peripheral neuropathy.  She is somewhat frustrated at the inability to manage her symptoms.  She has been taking medications to control her symptoms.  She was taking Mirapex which would give her temporary relief.  This medication no longer manages her symptoms.    Objective findings: Nails bilateral feet normal length and color.  Skin bilateral feet warm and intact.  DP and PT pulses +2/4 bilateral feet.  Capillary refill less than 2 seconds bilateral feet.  Negative clonus, negative Babinski bilateral feet.  Range of motion within normal limits bilateral feet.  Muscle power +5/5 bilaterally in all compartments.  The patient does have a fairly pronounced positive Tinel sign when percussing the common and superficial peroneal nerves as well as the tarsal tunnel and deep peroneal nerves both lower extremities.  The Tinel sign over the right common peroneal nerve is mild.    Assessment: Compression neuropathy, tarsal tunnel, mononeuritis    Plan: I informed the patient that since she has been afforded all conservative measures she may be a good candidate for nerve decompression with external neural lysis of the common, superficial, deep peroneal nerves as well as the tarsal tunnel release with nerve decompression and external lysis of the medial and lateral plantar nerves as well as the calcaneal nerves both lower extremities.  I told the patient she has at least a 60-70% chance of improvement of her symptoms.  The procedure was briefly described to the patient.  The patient stated she would like to  move forward with this surgical procedure but she would have to delay the procedure until fall.  When she is prepared to move forward with her surgical treatment plan she will contact the clinic at the appropriate time.

## 2021-06-14 NOTE — PROGRESS NOTES
Subjective: Brii is a 67-year-old lady who is here for continuing symptoms of restless legs.  She tells me that she has had an EMG which showed possible neuropathy.  She is presently using Mirapex.  And at one point in time was on Lyrica for the sensations.  Lyrica she has not been using she.  She is in process of getting off of any Mirapex because she does not feel like it has done anything.  She is presently on gabapentin 300 mg capsules but is following up with Dr. Monae and will be going up on the dose.  She tells me that someone informed her that she would have to have some blood work done prior to any further refills but we are both unclear about what exactly those are.  I reviewed her chart and I believe this is probably referring to her thyroid.  Her last TSH was slightly elevated and so we should adjust her thyroid today.  She also says that her feet are giving her a lot of trouble.  She said there are lumps on her feet and her feet hurt all the time.  She went to see a podiatrist about this but was put off by that person because of very quick advised to do an extensive surgery on her feet and her arms.  She then went to see Dr. Monae and got the plan for being on gabapentin discontinuing the Mirapex.  She also requests a prescription for Chantix.  She has used this in the past and it was very successful as long as she was on it.    Objective: Vital signs: Blood pressure 118/70 height is 5 feet 6-1/2 inches weight is 176 pounds this lady is alert and in no acute distress.  I reviewed her chart with her as well.  We decided that we would increase her levothyroxine again from her current dose of 75 mcg 200.  I think the bumps on her feet may very well be arthritic nodules.  She did discuss options for these with the neurologist as well.    Assessment: #1 restless legs  2.  Possible peripheral neuropathy as part of this .  3.  Probable osteoarthritis of the feet as well  4.  Hypothyroidism  5.  Tobacco  use    Plan: #1 labs drawn CMP  2.  Will prescribe 100 mcg of levothyroxine be taken 1 tab p.o. daily.  Plan to recheck thyroid in about 6 weeks  3.  Follow-up with Dr. Monae for continued adjustment of gabapentin  4.  We discussed trying a topical anti-inflammatory such as diclofenac and she was interested in this so I did send in a prescription for this  5.  Prescription sent in for Chantix as well  6.  Pneumonia shot given today.

## 2021-06-16 PROBLEM — M19.071 PRIMARY OSTEOARTHRITIS OF RIGHT FOOT: Status: ACTIVE | Noted: 2018-06-21

## 2021-06-16 PROBLEM — Z87.891 FORMER SMOKER: Status: ACTIVE | Noted: 2018-09-11

## 2021-06-16 NOTE — PROGRESS NOTES
Subjective: This patient comes in for evaluation 68-year-old female has a history of sore throat which is now a little better 4 day history of cough and fever.  She is getting temperatures in the morning and then again later at night.    Patient's felt a little nauseated has had some sweats.    She did have an influenza shot 10/22/2017.    She is a smoker cough is nonproductive.    She denies any weight loss denies any increased shortness of breath.    Does not feel wheezy.    Tobacco status: She  reports that she has been smoking Cigarettes.  She has been smoking about 0.50 packs per day. She has never used smokeless tobacco.    Patient Active Problem List    Diagnosis Date Noted     Mass of foot or toe 11/26/2014     Cellulitis Of The Arm      Irritable Bowel Syndrome      Esophageal Reflux      Allergies      Joint Pain Fingers      Hypothyroidism      Hyperlipidemia      Restless Legs Syndrome      Hypertension        Current Outpatient Prescriptions   Medication Sig Dispense Refill     aspirin 81 MG EC tablet Take 81 mg by mouth daily.       atorvastatin (LIPITOR) 40 MG tablet TAKE 1 TABLET BY MOUTH DAILY 90 tablet 1     b complex vitamins tablet Take 1 tablet by mouth daily.       cholecalciferol, vitamin D3, 1,000 unit tablet Take 2,000 Units by mouth daily.       diclofenac sodium (VOLTAREN) 1 % Gel Use BID -TID on affected area sparingly 100 g 1     gabapentin (NEURONTIN) 300 MG capsule Take 300 mg by mouth daily.       hydroCHLOROthiazide (HYDRODIURIL) 25 MG tablet TAKE 1 TABLET BY MOUTH ONCE DAILY 90 tablet 0     levothyroxine (SYNTHROID, LEVOTHROID) 100 MCG tablet Take 1 tablet (100 mcg total) by mouth daily. 90 tablet 3     lisinopril (PRINIVIL,ZESTRIL) 40 MG tablet TAKE 1 TABLET BY MOUTH EVERY DAY 90 tablet 1     LORATADINE ORAL Take 1 tablet by mouth daily as needed.       polymyxin B-trimethoprim (FOR POLYTRIM) 10,000 unit- 1 mg/mL Drop ophthalmic drops Use 1 drop in affected eye every 1-2 hours  while awake til clear then TID x 3 days. 1 Bottle 0     pramipexole (MIRAPEX) 0.125 MG tablet Take 0.125 mg by mouth. 2-3 hours before bedtime.  May double dose every 7 days.  Max of 0.5 MG.       levothyroxine (SYNTHROID, LEVOTHROID) 25 MCG tablet Take 3 tablets (75 mcg total) by mouth Daily at 6:00 am. 180 tablet 0     oseltamivir (TAMIFLU) 75 MG capsule Take 1 capsule (75 mg total) by mouth 2 (two) times a day for 5 days. 10 capsule 0     pregabalin (LYRICA) 25 MG capsule Take 1 capsule (25 mg total) by mouth 2 (two) times a day. 1 tab at bedtime x 3 days.  Then may increase to 1 cap  Twice daily. 60 capsule 2     varenicline (CHANTIX JORY) 0.5 mg (11)- 1 mg (42) tablet Take 0.5 mg q dayx3d then 0.5mg BIDx 3d the 1 mg  tablet 2     varenicline (CHANTIX) 1 mg tablet Take 1 tablet (1 mg total) by mouth 2 (two) times a day. 120 tablet 2     No current facility-administered medications for this visit.        ROS:   Review of systems negative other than as outlined above    Objective:    /60 (Patient Site: Left Arm, Patient Position: Sitting, Cuff Size: Adult Large)  Pulse 80  Temp 98.2  F (36.8  C) (Oral)   Resp 16  Wt 177 lb (80.3 kg)  SpO2 95%  BMI 28.14 kg/m2  Body mass index is 28.14 kg/(m^2).      General appearance no acute distress    Temp is 98 2    Lungs sound clear throughout    Neck without adenopathy oropharynx was erythematous posteriorly no exudate    Canals and TMs normal.  Skin was normal no rashes    Heart was regular S1-S2    Extremities without edema.    Strep was negative influenza A positive    Results for orders placed or performed in visit on 02/27/18   Rapid Strep A Screen-Throat   Result Value Ref Range    Rapid Strep A Antigen No Group A Strep detected, presumptive negative No Group A Strep detected, presumptive negative   Influenza A/B Rapid Test   Result Value Ref Range    Influenza  A, Rapid Antigen Influenza A antigen detected (!) No Influenza A antigen detected     Influenza B, Rapid Antigen No Influenza B antigen detected No Influenza B antigen detected       Assessment:  1. Influenza A  oseltamivir (TAMIFLU) 75 MG capsule   2. Bronchitis  Influenza A/B Rapid Test   3. Pharyngitis  Rapid Strep A Screen-Throat    Group A Strep, RNA Direct Detection, Throat     Patient go on Tamiflu 75 mg twice daily, for 5 days.    Push fluids get rest    Follow-up if any worsening.    She is not working.  I discussed exposure/contagious.    Avoid aspirin use Tylenol    Plan: As outlined above    This transcription uses voice recognition software, which may contain typographical errors.

## 2021-06-18 NOTE — PROGRESS NOTES
Assessment and Plan:   1. Routine general medical examination at a health care facility:     2. Encounter for tobacco use cessation counseling:  Discussed possible side effects of Chantix, recommended treatment interval.  Prescription for starter pack and continuation pack provided. Total time on smoking cessation 5 minutes.   - varenicline (CHANTIX) 1 mg tablet; Take 1 tablet (1 mg total) by mouth 2 (two) times a day.  Dispense: 120 tablet; Refill: 2  - varenicline (CHANTIX JORY) 0.5 mg (11)- 1 mg (42) tablet; Take 0.5 mg q dayx3d then 0.5mg BIDx 3d the 1 mg BID  Dispense: 120 tablet; Refill: 2    3. Discharge from right nipple:  Crusting of right nipple noted on exam, patient states she has noted some discharge from this side for approximately one year. Typically noted after it has dried. Normal exam.  Recent mammogram normal two months ago.  Will refer to Dr. Oliva to determine if and what additional workup is recommended.    - Ambulatory referral to General Surgery    4. Screening for diabetes mellitus  - Comprehensive Metabolic Panel    5. Screening for deficiency anemia  - HM2(CBC w/o Differential)    6. Acquired hypothyroidism  - Thyroid Cascade    7. Mixed hyperlipidemia  - Lipid Cascade    8. Hypertension:  Well controlled on lisinopril and HCTZ.   - Comprehensive Metabolic Panel  - Basic Metabolic Panel; Future    9. Restless Legs Syndrome  - HM2(CBC w/o Differential)    10. Insect bite:  Lesion consistent with large hive, central punctum indicates likely bite. Reassured and advised on use of antihistamines if bothersome.  She will jessi the borders of this and if it continues to spread or become painful recheck.      I have counseled the patient for tobacco cessation and prescribed the patient a tobacco cessation medication and the follow up will occur  at the next visit.  The following health maintenance schedule was reviewed with the patient and provided in printed form in the after visit summary:    Health Maintenance   Topic Date Due     ADVANCE DIRECTIVES DISCUSSED WITH PATIENT  01/02/1968     DXA SCAN  12/06/2018 (Originally 1/2/2016)     FALL RISK ASSESSMENT  11/13/2018     MAMMOGRAM  04/27/2020     TD 18+ HE  01/17/2022     COLONOSCOPY  06/05/2025     PNEUMOCOCCAL POLYSACCHARIDE VACCINE AGE 65 AND OVER  Completed     INFLUENZA VACCINE RULE BASED  Completed     PNEUMOCOCCAL CONJUGATE VACCINE FOR ADULTS (PCV13 OR PREVNAR)  Completed     ZOSTER VACCINE  Completed        Subjective:   Chief Complaint: Brii Scruggs is an 68 y.o. female here for an Annual Wellness visit.   HPI:  She is a previous patient of Dr. Allison.  PMH notable for hypothyroidism, IBS, GERD, HTN, hyperlipidemia, restless legs.      HTN:  Well controlled on lisinopril 40mg, HCTZ 25mg     Hypothyroidism:  Currently taking levothyroxine 100mcg.  Has not rechecked since last dose adjustment.  She does feel fatigued though is unsure if this is related to insomnia issues  She states she lays awake at night, sometimes with thoughts racing and sometimes due to RLS symptoms.      RLS/neuropathy: has followed with neurology for restless legs and neuropathy. On gabapentin currently. No improvement with Lyrica or Mirapex previously.  Has begun to address osteoarthritis of the feet with [place] and states she noted significant improvement in tingling in her feet with steroid injection just before [place]. Now starting to wear off and symptoms returning.     Bite:  Right lower abdomen. First noted four days ago when she began itching.  Redness, swelling and warmth spread and seemed to reach a peak a little over 24 hours ago.  Continues to be highly pruritic. Unaware of a bug bite though assumes this is what it was.      Smoking:  Currently 1/2 ppd, >30 year pack history. Interested in trying Chantix.  Did discuss this with Dr. Allison previously and was prescribed, however at that time she felt the cost was too high.  Now she is feeling  committed and thinks she may be more dedicated to quitting if she pays a high cost for the medication.  Has worked for her in the distant past for a short time period though quit taking the medication.  Gum not effective.       Review of Systems:  Please see above.  The rest of the review of systems are negative for all systems.    Patient Care Team:  No Primary Care Provider as PCP - General  Susana Walton MD (Orthopedics)     Patient Active Problem List   Diagnosis     Irritable Bowel Syndrome     Esophageal Reflux     Allergies     Joint Pain Fingers     Hypothyroidism     Hyperlipidemia     Restless Legs Syndrome     Hypertension     Cellulitis Of The Arm     Mass of foot or toe     Past Medical History:   Diagnosis Date     Disease of thyroid gland      Hypertension       Past Surgical History:   Procedure Laterality Date     HYSTERECTOMY       OOPHORECTOMY       DE TOTAL ABDOM HYSTERECTOMY      Description: Total Abdominal Hysterectomy;  Recorded: 08/10/2011;      Family History   Problem Relation Age of Onset     Colon cancer Sister      Breast cancer Sister      No Medical Problems Mother      No Medical Problems Father      No Medical Problems Daughter      No Medical Problems Maternal Grandmother      No Medical Problems Maternal Grandfather      No Medical Problems Paternal Grandmother      No Medical Problems Paternal Grandfather      No Medical Problems Maternal Aunt      No Medical Problems Paternal Aunt      BRCA 1/2 Neg Hx      Cancer Neg Hx      Endometrial cancer Neg Hx      Ovarian cancer Neg Hx       Social History     Social History     Marital status:      Spouse name: N/A     Number of children: N/A     Years of education: N/A     Occupational History     Not on file.     Social History Main Topics     Smoking status: Current Every Day Smoker     Packs/day: 0.50     Types: Cigarettes     Smokeless tobacco: Never Used     Alcohol use Yes     Drug use: No     Sexual activity: Not  on file     Other Topics Concern     Not on file     Social History Narrative      Current Outpatient Prescriptions   Medication Sig Dispense Refill     aspirin 81 MG EC tablet Take 81 mg by mouth daily.       atorvastatin (LIPITOR) 40 MG tablet TAKE 1 TABLET BY MOUTH DAILY 90 tablet 1     b complex vitamins tablet Take 1 tablet by mouth daily.       gabapentin (NEURONTIN) 300 MG capsule Take 300 mg by mouth daily.       hydroCHLOROthiazide (HYDRODIURIL) 25 MG tablet Take 1 tablet (25 mg total) by mouth daily. 90 tablet 0     lisinopril (PRINIVIL,ZESTRIL) 40 MG tablet TAKE 1 TABLET BY MOUTH EVERY DAY 90 tablet 1     cholecalciferol, vitamin D3, 1,000 unit tablet Take 2,000 Units by mouth daily.       diclofenac sodium (VOLTAREN) 1 % Gel Use BID -TID on affected area sparingly 100 g 1     levothyroxine (SYNTHROID, LEVOTHROID) 100 MCG tablet Take 1 tablet (100 mcg total) by mouth daily. 90 tablet 3     levothyroxine (SYNTHROID, LEVOTHROID) 25 MCG tablet Take 3 tablets (75 mcg total) by mouth Daily at 6:00 am. 180 tablet 0     LORATADINE ORAL Take 1 tablet by mouth daily as needed.       polymyxin B-trimethoprim (FOR POLYTRIM) 10,000 unit- 1 mg/mL Drop ophthalmic drops Use 1 drop in affected eye every 1-2 hours while awake til clear then TID x 3 days. 1 Bottle 0     pramipexole (MIRAPEX) 0.125 MG tablet Take 0.125 mg by mouth. 2-3 hours before bedtime.  May double dose every 7 days.  Max of 0.5 MG.       pregabalin (LYRICA) 25 MG capsule Take 1 capsule (25 mg total) by mouth 2 (two) times a day. 1 tab at bedtime x 3 days.  Then may increase to 1 cap  Twice daily. 60 capsule 2     varenicline (CHANTIX JORY) 0.5 mg (11)- 1 mg (42) tablet Take 0.5 mg q dayx3d then 0.5mg BIDx 3d the 1 mg  tablet 2     varenicline (CHANTIX) 1 mg tablet Take 1 tablet (1 mg total) by mouth 2 (two) times a day. 120 tablet 2     No current facility-administered medications for this visit.       Objective:   Vital Signs:   Visit Vitals      "/76 (Patient Site: Right Arm, Patient Position: Sitting, Cuff Size: Adult Large)     Pulse 69     Ht 5' 5.5\" (1.664 m)     Wt 176 lb 6.4 oz (80 kg)     SpO2 99%     BMI 28.91 kg/m2        VisionScreening:  No exam data present     PHYSICAL EXAM  GENERAL: Alert, well appearing female  PSYCH: Pleasant mood, affect appropriate.   SKIN: Right lower abdomen with slightly raised, erythematous lesion 2 inches in diameter, distinct borders.  Central punctum.   EYES: Conjunctiva pink, sclera white, no exudates. LUZ.  EOMs intact. Corneal light reflex bilaterally, red reflex present.Undilated fundoscopic exam normal  EARS: TMs pearly grey, no bulging, redness, retraction. Hearing grossly normal.   NOSE: Nares patent, no discharge.  Normal nasal mucosa, septum and turbinates.  MOUTH: Pharynx moist, pink without exudate. No tonsillar enlargement  NECK: No lymphadenopathy. Thyroid borders smooth without enlargement, nodules.   CV: Regular rate and rhythm without murmurs, rubs or gallops.  RESP: Lung sounds clear  ABDOMEN: BS+. Abdomen soft, nontender to palpation without guarding. No organomegaly  PV :No edema  BREASTS: Breasts symmetric, no dimpling, masses or skin discolorations seen. Right nipple with small, crusted dark area at 6 o'clock and lighter crusted area at 1 o'clock.  Otherwise nipples and areolas symmetric.  On palpation, breast tissue supple and nontender. No masses or nodules. Axillary and epitrochlear lymph nodes nonpalpable.       Assessment Results 6/21/2018   Activities of Daily Living No help needed   Instrumental Activities of Daily Living No help needed   Some recent data might be hidden     A Mini-Cog score of 0-2 suggests the possibility of dementia, score of 3-5 suggests no dementia    Identified Health Risks:     She is at risk for lack of exercise and has been provided with information to increase physical activity for the benefit of her well-being.  The patient was counseled and encouraged to " consider modifying their diet and eating habits. She was provided with information on recommended healthy diet options.  Information regarding advance directives (living celis), including where she can download the appropriate form, was provided to the patient via the AVS.

## 2021-06-18 NOTE — PROGRESS NOTES
This is a 68 y.o. woman whom asked to see by Annita Patton CNP for evaluation of right nipple discharge and nipple lesion.  She states that over the past longer she just occasionally sees a little crusting on the tip of her nipple.  May be an occasional spot on her clothing.  It is not enough for her to be able to describe it is either clear or bloody. She recently saw her primary care physician who also noted another concerning area in the lower portion of the nipple.  Just recently had screening mammograms that were normal.    Family History: She has no family history of breast cancer.    PMH:  Past Medical History:   Diagnosis Date     Disease of thyroid gland      Hypertension        Medications:    Current Outpatient Prescriptions:      aspirin 81 MG EC tablet, Take 81 mg by mouth daily., Disp: , Rfl:      atorvastatin (LIPITOR) 40 MG tablet, TAKE 1 TABLET BY MOUTH DAILY, Disp: 90 tablet, Rfl: 1     b complex vitamins tablet, Take 1 tablet by mouth daily., Disp: , Rfl:      cholecalciferol, vitamin D3, 1,000 unit tablet, Take 2,000 Units by mouth daily., Disp: , Rfl:      gabapentin (NEURONTIN) 300 MG capsule, Take 300 mg by mouth daily., Disp: , Rfl:      gabapentin (NEURONTIN) 600 MG tablet, TAKE 1 TABLET BY MOUTH AT BEDTIME IF NEEDED, Disp: , Rfl: 0     hydroCHLOROthiazide (HYDRODIURIL) 25 MG tablet, Take 1 tablet (25 mg total) by mouth daily., Disp: 90 tablet, Rfl: 0     levothyroxine (SYNTHROID, LEVOTHROID) 100 MCG tablet, Take 1 tablet (100 mcg total) by mouth daily., Disp: 90 tablet, Rfl: 3     lisinopril (PRINIVIL,ZESTRIL) 40 MG tablet, TAKE 1 TABLET BY MOUTH EVERY DAY, Disp: 90 tablet, Rfl: 1     varenicline (CHANTIX JORY) 0.5 mg (11)- 1 mg (42) tablet, Take 0.5 mg q dayx3d then 0.5mg BIDx 3d the 1 mg BID, Disp: 120 tablet, Rfl: 2     varenicline (CHANTIX) 1 mg tablet, Take 1 tablet (1 mg total) by mouth 2 (two) times a day., Disp: 120 tablet, Rfl: 2     Allergies:  Allergies   Allergen Reactions      "Amoxicillin Rash       Social History:   reports that she has been smoking Cigarettes.  She has been smoking about 0.50 packs per day. She has never used smokeless tobacco. She reports that she drinks alcohol. She reports that she does not use illicit drugs.      Review of systems is otherwise negative for full 12 point review of systems.    Physical exam:  /60 (Patient Site: Right Arm, Patient Position: Sitting, Cuff Size: Adult Regular)  Pulse 61  Ht 5' 5\" (1.651 m)  Wt 176 lb (79.8 kg)  SpO2 99%  Breastfeeding? No  BMI 29.29 kg/m2  General: alert, appears stated age and cooperative  Lungs: clear to auscultation bilaterally  CV: regular rate and rhythm, S1, S2 normal, no murmur, click, rub or gallop  Breasts: No palpable mass in either breast.  There is some crustiness in the central portion of the right nipple but despite fairly vigorous palpation I am not able to get any drainage.  There is a small almost looks like a skin tag in the very lower portion of the nipple.  When I tried to just see if we could remove it however it did not seem to just be a cyst.  For that reason I used a 15 blade knife.  After sterile prep with alcohol I removed the entire lesion and was placed in formalin.  Axilla: No axillary adenopathy.    Reviewed her mammograms and ultrasound which are unremarkable. She is fairly dense however.    Impression:  Rightnipple discharge and lesion of the nipple.  I get feeling is that this is benign.  It is not at all typical of Paget's disease but I did send the small skin lesion to pathology and that may change how we look at this.  The drainage itself I do not think is anything to worry about.  It is not clearly a pathologic type drainage.  It sounds more physiologic.     Plan: Await the final pathology.  Benign then I think follow-up can just be as needed.  Obviously if there is a concern then we will address that.    "

## 2021-06-20 NOTE — PROGRESS NOTES
OFFICE VISIT NOTE      Assessment & Plan   Brii Scruggs is a 68 y.o. female with 3 weeks of an odd sour taste in her mouth. She does not think this is reflux but is unsure what it is. While waiting for labs, will try GERD meds. Refer prn.  Cough x 3 days - dry. CXR clear. Treat with albuterol.  Leg arthritis - prn NSAIDs  Foot pain now in hands - increase gabapentin (from 600qh to 300qAM and 900 qhs and follow)  Check albs, includine TSH to be sure thyroid is stable.  Strong encouragement given regarding smoking cessation! She's using chantix and might be having side effects from it, but she's so far not smoked!!!!    Diagnoses and all orders for this visit:    Bad taste in mouth  -     Amylase  -     Lipase  -     Hepatic Profile    Mixed hyperlipidemia    Acquired hypothyroidism  -     Thyroid Stimulating Hormone (TSH)    Hypertension    Nausea  -     Amylase  -     Lipase  -     Hepatic Profile    Cough  -     XR Chest 2 Views  -     Nebulizer  -     albuterol nebulizer solution 3 mL (PROVENTIL); Take 3 mL by nebulization once.    Wheeze  -     XR Chest 2 Views  -     Nebulizer  -     albuterol nebulizer solution 3 mL (PROVENTIL); Take 3 mL by nebulization once.    Former smoker    Other orders  -     albuterol (PROAIR HFA;PROVENTIL HFA;VENTOLIN HFA) 90 mcg/actuation inhaler; Inhale 2 puffs every 6 (six) hours as needed for wheezing.  Dispense: 1 each; Refill: 0      Jerri Cain MD    I have counseled the patient for tobacco cessation and the follow up will occur  via [facility].          Subjective:   Chief Complaint:  Cough (x3days, dry)    68 y.o. female.     1) dry cough following 3 weeks of tinny, sour taste in touch  75% of the day she's nauseated  She's on chantix; July 3rd last cigarette    Dr. 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      atorvastatin (LIPITOR) 40 MG tablet Take 1 tablet (40 mg total) by mouth daily.      b complex vitamins tablet Take 1 tablet by mouth daily.      cholecalciferol, vitamin D3, 1,000 unit tablet Take 2,000 Units by mouth daily.      gabapentin (NEURONTIN) 600 MG tablet TAKE 1 TABLET BY MOUTH AT BEDTIME IF NEEDED 6/26/2018: Received from: External Pharmacy     hydroCHLOROthiazide (HYDRODIURIL) 25 MG tablet Take 1 tablet (25 mg total) by mouth daily.      levothyroxine (SYNTHROID, LEVOTHROID) 100 MCG tablet Take 1 tablet (100 mcg total) by mouth daily.      lisinopril (PRINIVIL,ZESTRIL) 40 MG tablet Take 1 tablet (40 mg total) by mouth daily.      varenicline (CHANTIX JORY) 0.5 mg (11)- 1 mg (42) tablet Take 0.5 mg q dayx3d then 0.5mg BIDx 3d the 1 mg BID      varenicline (CHANTIX) 1 mg tablet Take 1 tablet (1 mg total) by mouth 2 (two) times a day.      albuterol (PROAIR HFA;PROVENTIL HFA;VENTOLIN HFA) 90 mcg/actuation inhaler Inhale 2 puffs every 6 (six) hours as needed for wheezing.        PSFHx: appropriate sections of PMH completed/filled in   Tobacco Status:  She  reports that she quit smoking about 2 months ago. Her smoking use included Cigarettes. She smoked 0.50 packs per day. She has never used smokeless tobacco.    Review of Systems:  No fever.  No vomiting. All other systems negative except as noted above.    Objective:    /62  Pulse 82  Temp 98.9  F (37.2  C) (Oral)   Resp 12  Ht 5' 5\" (1.651 m)  Wt 172 lb 6.4 oz (78.2 kg)  SpO2 99%  BMI 28.69 kg/m2  GENERAL: No acute distress.    Lungs: aeration fair, expiratory wheezes bilaterally on initial exam; after " neb and some time, wheezes were completely gone but there were light rales at bases  Ht: reg s1s2    CXR: negative    Spent 50 min face to face with patient with more the 50% spent in counseling, reviewing chart, and coordination of care and discussing smoking cessation benefits, inhaler use, medications -especially gabapentin increase. 10min total spent on smoking cessation (out of the 50min).

## 2021-06-21 NOTE — PROGRESS NOTES
SUBJECTIVE: Brii Scruggs is a 68 y.o. female with:  Chief Complaint   Patient presents with     Cough     has had a cough since sept, not getting better, coughing all day long     She has had a cough since Sept 11.  Had negative CXR when she was seen for this.  Prescribed albuterol inhaler which she has been using off / on.  Thinks it helps a little.  Was better for 1 week then got worse.  She also has a funny metallic taste in mouth.  She has had rhinorrhea/ congestion/ frontal face pain.  Throat a little sore.  No shortness of breath.  Has occasional wheezing.  No chest pain.      No exposures.  No travel.  No history of asthma.  She has a 50 pack year of smoking - just quit in July.      SH: .    OBJECTIVE: /68  Pulse 96  Temp 98  F (36.7  C) (Oral)   Resp 20  Wt 172 lb 8 oz (78.2 kg)  SpO2 94%  BMI 28.71 kg/m2   No acute distress.  HEENT: Head is atraumatic and/normocephalic.  PERRL.  Conjunctiva clear.  Tympanic membranes grey with normal landmarks and normal light reflexes.  No nasal discharge.  Oropharynx is pink and moist.  Sinuses nontender.  Neck: Supple.  No lymphadenopathy or thyromegaly.  Lungs: She has expiratory wheezes with coarse breath sounds.  No retractions, or tachypnea.  Heart: RRR. S1 and S2 normal.  No murmurs, rubs, or gallops.  Neuro: Awake, alert, oriented x 3.  Normal strength and tone.  Normal gait.     Duoneb administered and repeat lung exam showed decreased wheezing.    Brii was seen today for cough.    Diagnoses and all orders for this visit:    Acute bronchospasm  -     ipratropium-albuterol 0.5-2.5 mg/3 mL nebulizer solution 3 mL (DUO-NEB); Take 3 mL by nebulization once.  -     beclomethasone (QVAR) 80 mcg/actuation inhaler; Inhale 1 puff 2 (two) times a day.     We discussed prednisone vs inhaled steroid.  She wishes to start with inhaled steroid first.  I think her initial infection is viral ( normal cxr ) but if not improving consider a course of  DEMETRIA-toya.    Patient Instructions   Use steroid inhaler twice daily for 2 weeks.    If not better after 3-4 days let me know.  We could consider oral prednisone for 3 days only or a course of antibiotics ( azithromycin ).         Almaz aRygoza

## 2021-06-23 NOTE — PROGRESS NOTES
Assessment/ Plan  Problem List Items Addressed This Visit        Unprioritized    Restless Legs Syndrome - Primary     Previously [facility]x [facility] and dose increased  States that this was not helpful  Now is on gabapentin, she understands for restless legs  Still having symptoms    I suggested that her symptoms sound most compatible with RLS rather than neuropathy (apparently borderline EMG) and that bilateral, symmetric, foot symptoms would be unlikely from lumbar spine disease, though not impossible    She has diminished reflexes which would seem to make upper motor neuron problem very unlikely    I think a trial of alternative Parkinson agent is appropriate, discussed this at length, start ropinirole 0.25 mg, go to 5 mg after a few days.  Discussed that with this medication, unlike gabapentin, and she could use it as needed    In the meantime, since it seems it is not helping anything, she should gradually wean off gabapentin over 1 month.  Patient expressed understanding         Primary osteoarthritis of right foot     See history below, referral back to [place] [facility]         Relevant Orders    Ambulatory referral to Orthopedics        Subjective  CC:  Chief Complaint   Patient presents with     Foot Pain     Right foot pain - center of foot shooting down into toes      HPI:  Patient is a 69-year-old female who comes in with a number of different foot problems    First, she is seen Dr. Uribe, a nonoperative orthopedist at [place] a number of times for right ankle pain, deemed due to arthritis of the tarsometatarsal joints.  She has had a number of injections under fluoroscopy through radiology into these joints which have been helpful each time but longevity of benefit has diminished.  Last time she saw  was in 2017 and she indicated that the next time, if she needs another intact injection, she would need to see an orthopedist first.    Patient had been doing fairly  well lately until about 10 days ago she heard a pop and had increase in her dorsal foot pain.  This pain is not gotten any better.  She has not noticed any bruising or swelling associated with this.  Pain is located over the center of the forefoot.  It is achy/pinching in nature.    Foot symptoms have been complicated by neuropathic symptoms. She saw Dr. Monae in January 2017 She had an MRI done of her lower spine 9/1/2015.  This showed mild to moderate advanced multilevel spondylitic changes of the lumbar spine in the setting of 15 degree levoconvex curvature.  Findings are most significant at L3-4 where there is a right central disc extrusion that contributes to moderate to severe right lateral recess stenosis and encroachment upon transversing right L4 nerve root.  There is 4 x 4 millimeter central disc extrusion at L4-5 without resulting significant spinal canal stenosis or neural impingement and varying degrees of neuroforaminal stenosis moderate at L3-4 on the right side.  EMG was done at [facility] spine 7/6/16 and read as essentially normal but they were borderline amplitudes of right sural and superficial peroneal snap.  The rest of conduction was normal.  Dr. Monae felt she had restless leg syndrome.  Mirapex was started and increased by Dr. Monae.    PFSH:  Patient Active Problem List   Diagnosis     Hypothyroidism     Mixed hyperlipidemia     Restless Legs Syndrome     Hypertension     Primary osteoarthritis of right foot     Former smoker     Current medications reviewed as follows:  Current Outpatient Medications on File Prior to Visit   Medication Sig     albuterol (PROAIR HFA;PROVENTIL HFA;VENTOLIN HFA) 90 mcg/actuation inhaler Inhale 2 puffs every 6 (six) hours as needed for wheezing.     aspirin 81 MG EC tablet Take 81 mg by mouth daily.     atorvastatin (LIPITOR) 40 MG tablet Take 1 tablet (40 mg total) by mouth daily.     b complex vitamins tablet Take 1 tablet by mouth daily.     beclomethasone  (QVAR) 80 mcg/actuation inhaler Inhale 1 puff 2 (two) times a day.     cholecalciferol, vitamin D3, 1,000 unit tablet Take 2,000 Units by mouth daily.     gabapentin (NEURONTIN) 300 MG capsule Take 300mg PO qAM and 900mg PO before bed     hydroCHLOROthiazide (HYDRODIURIL) 25 MG tablet Take 1 tablet (25 mg total) by mouth daily.     levothyroxine (SYNTHROID, LEVOTHROID) 100 MCG tablet Take 1 tablet (100 mcg total) by mouth daily.     lisinopril (PRINIVIL,ZESTRIL) 40 MG tablet Take 1 tablet (40 mg total) by mouth daily.     QVAR REDIHALER 80 mcg/actuation HFAB inhaler TAKE 1 PUFF BY MOUTH TWICE A DAY     varenicline (CHANTIX JORY) 0.5 mg (11)- 1 mg (42) tablet Take 0.5 mg q dayx3d then 0.5mg BIDx 3d the 1 mg BID     varenicline (CHANTIX) 1 mg tablet Take 1 tablet (1 mg total) by mouth 2 (two) times a day.     No current facility-administered medications on file prior to visit.      Social History     Tobacco Use   Smoking Status Former Smoker     Packs/day: 0.50     Types: Cigarettes     Last attempt to quit: 7/3/2018     Years since quittin.5   Smokeless Tobacco Never Used     Social History     Social History Narrative     Not on file     Patient Care Team:  Jerri Cain MD as PCP - General (Family Medicine)  Isabelle, Susana Capone MD (Orthopedics)  ROS  Full 10 system review including constitutional, respiratory, cardiac, gi, urinary, rheumatologic, neurologic, reproductive, dermatologic psychiatric is  performed  and is otherwise negative         Objective  Physical Exam  Vitals:    19 1354   BP: 108/68   Patient Site: Left Arm   Patient Position: Sitting   Cuff Size: Adult Regular   Pulse: 76   Resp: 16   Weight: 173 lb 8 oz (78.7 kg)     Body mass index is 28.87 kg/m .  Wallace of foot and leg are basically normal.  Intact to monofilament testing, normal reflexes.  Diagnostics:   None      Please note: Voice recognition software was used in this dictation.  It may therefore contain typographical  errors.

## 2021-06-23 NOTE — TELEPHONE ENCOUNTER
Refill Approved    Rx renewed per Medication Renewal Policy. Medication was last renewed on 6/21/18.    Noa Comer, [facility] Triage/Med Refill 2/6/2019     Requested Prescriptions   Pending Prescriptions Disp Refills     CHANTIX 1 mg tablet [Pharmacy Med Name: CHANTIX 1 MG TABLET] 112 tablet 2     Sig: TAKE 1 TABLET BY MOUTH TWO TIMES A DAY.    Varenicline Refill Protocol Passed - 2/6/2019  9:40 AM       Passed - Normal Serum Creatinine in past 12 months     Creatinine   Date Value Ref Range Status   06/21/2018 1.03 0.60 - 1.10 mg/dL Final            Passed - PCP or prescribing provider visit in last 12 or next 3 months.    Last office visit with prescriber/PCP: Visit date not found OR same dept: 10/19/2018 Almaz Raygoza MD  Last physical: 6/21/2018       Next visit within 3 mo: Visit date not found  Next physical within 3 mo: Visit date not found  Prescriber OR PCP: Annita Patton CNP  Last diagnosis associated with med order: 1. Encounter for tobacco use cessation counseling  - CHANTIX 1 mg tablet [Pharmacy Med Name: CHANTIX 1 MG TABLET]; TAKE 1 TABLET BY MOUTH TWO TIMES A DAY.  Dispense: 112 tablet; Refill: 2     Requested Prescriptions     Pending Prescriptions Disp Refills     CHANTIX 1 mg tablet [Pharmacy Med Name: CHANTIX 1 MG TABLET] 112 tablet 2     Sig: TAKE 1 TABLET BY MOUTH TWO TIMES A DAY.     May refill for 3 months if protocol passes.

## 2021-06-26 ENCOUNTER — HEALTH MAINTENANCE LETTER (OUTPATIENT)
Age: 71
End: 2021-06-26

## 2021-07-03 NOTE — ADDENDUM NOTE
Addendum Note by Clarisse Barkley at 6/26/2018 11:59 PM     Author: Clarisse Barkley Service: -- Author Type: --    Filed: 12/7/2018 11:26 AM Date of Service: 6/26/2018 11:59 PM Status: Signed    : Clarisse Barkley    Encounter addended by: Clarisse Barkley on: 12/7/2018 11:26 AM      Actions taken: Charge Capture section accepted

## 2021-07-22 ENCOUNTER — RECORDS - HEALTHEAST (OUTPATIENT)
Dept: FAMILY MEDICINE | Facility: CLINIC | Age: 71
End: 2021-07-22

## 2021-07-22 DIAGNOSIS — Z12.31 OTHER SCREENING MAMMOGRAM: ICD-10-CM

## 2021-10-16 ENCOUNTER — HEALTH MAINTENANCE LETTER (OUTPATIENT)
Age: 71
End: 2021-10-16

## 2021-12-07 ENCOUNTER — TRANSCRIBE ORDERS (OUTPATIENT)
Dept: OTHER | Age: 71
End: 2021-12-07
Payer: COMMERCIAL

## 2021-12-07 DIAGNOSIS — R49.0 MUSCLE TENSION DYSPHONIA: Primary | ICD-10-CM

## 2021-12-08 ENCOUNTER — TRANSCRIBE ORDERS (OUTPATIENT)
Dept: OTHER | Age: 71
End: 2021-12-08
Payer: COMMERCIAL

## 2021-12-14 ENCOUNTER — ANCILLARY PROCEDURE (OUTPATIENT)
Dept: MAMMOGRAPHY | Facility: CLINIC | Age: 71
End: 2021-12-14
Attending: FAMILY MEDICINE
Payer: COMMERCIAL

## 2021-12-14 DIAGNOSIS — Z12.31 VISIT FOR SCREENING MAMMOGRAM: ICD-10-CM

## 2021-12-14 PROCEDURE — 77067 SCR MAMMO BI INCL CAD: CPT | Mod: TC | Performed by: RADIOLOGY

## 2021-12-22 ENCOUNTER — HOSPITAL ENCOUNTER (OUTPATIENT)
Dept: SPEECH THERAPY | Facility: CLINIC | Age: 71
Setting detail: THERAPIES SERIES
End: 2021-12-22
Attending: STUDENT IN AN ORGANIZED HEALTH CARE EDUCATION/TRAINING PROGRAM
Payer: COMMERCIAL

## 2021-12-22 DIAGNOSIS — R49.0 MUSCLE TENSION DYSPHONIA: ICD-10-CM

## 2021-12-22 PROCEDURE — 92524 BEHAVRAL QUALIT ANALYS VOICE: CPT | Mod: GN | Performed by: STUDENT IN AN ORGANIZED HEALTH CARE EDUCATION/TRAINING PROGRAM

## 2021-12-22 PROCEDURE — 92507 TX SP LANG VOICE COMM INDIV: CPT | Mod: GN | Performed by: STUDENT IN AN ORGANIZED HEALTH CARE EDUCATION/TRAINING PROGRAM

## 2021-12-28 NOTE — PROGRESS NOTES
[facility]      OUTPATIENT SPEECH LANGUAGE PATHOLOGY VOICE EVALUATION  PLAN OF TREATMENT FOR OUTPATIENT REHABILITATION  (COMPLETE FOR INITIAL CLAIMS ONLY)    Patient's Last Name, First Name, M.I.  YOB: 1950  Brii Scruggs                        Provider s Name: [facility] Medical Record No.  [id number]     Onset Date:  12/7/2021 (order date)    Start of Care Date:  12/22/2021   Type:     ___PT  __OT   _X_SLP    Medical Diagnosis: Muscle tension dysphonia   Speech Language Pathology Diagnosis:  Dysphonia    Visits from SOC: 1      _________________________________________________________________________________  Plan of Treatment/Functional Goals:  Voice         Goals     1. Goal Identifier: Generalization       Goal Description: Patient will report a week of typical activities in which dysphonia and vocal fatigue do not exceed a level of 2 out of 10, 90% of the time, so that patient is able to meet her vocal demands for speech and singing.       Target Date: 03/22/22   2. Goal Identifier: Speaking voice quality       Goal Description: In a 20-minute speech task, patient will demonstrate roughness, breathiness, strain, and pitch breaks that do not exceed a level of 2 out of 10, 90% of the time by SLP judgment, so that patient is able to meet her speaking voice quality demands.       Target Date: 03/22/22   3. Goal Identifier: Singing voice quality       Goal Description: In a 10-minute singing task, patient will demonstrate ability to phonate to G5 with roughness, breathiness, strain, and pitch/phonation breaks that do not exceed a level of 2 out of 10, 90% of the time by SLP judgment, in order to improve voice quality and range for singing tasks.       Target Date: 03/22/22   4. Goal Identifier: Massage       Goal Description: Patient will learn, demonstrate,  and implement use of circumlaryngeal massage exercises independently 1-2x per day, every other day, in order to promote reduced laryngeal discomfort and tension.       Target Date: 03/22/22                     Frequency and Duration: 1x/week for 6 weeks with 2-3 monthly follow-ups  Cristiana Sands, SLP       I CERTIFY THE NEED FOR THESE SERVICES FURNISHED UNDER        THIS PLAN OF TREATMENT AND WHILE UNDER MY CARE .             Physician Signature               Date    X_____________________________________________________                      Certification Date From:  12/22/21  Certification Date To:  03/22/22  Referring Provider: Jean Marie Collins MD                 Initial Assessment        See Epic Evaluation Start of Care

## 2021-12-28 NOTE — PROGRESS NOTES
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  She formerly sang with a [employer] choir, but currently primarily participates in Worship singing at [employer].  She is normally a soprano, but it is currently difficult for her to sing at higher pitches.   Surgical/Medical history reviewed Yes   Pertinent History Of Current Problem 72yo female presenting with several years of dysphonia.  Pt reports that her voice sounds \"weird,\" and that low pitched phonation is easier than high pitched phonation.  Her voice will occasionally cut out when singing, especially at higher pitches.  Her voice will tire with use, especially if she is singing.  Her throat will feel tight and tired with singing as well.  She feels that her voice worsened after she quit smoking, and that wearing a face covering also makes her voice worse.  She takes medication for silent reflux, and has had various medical treatments and surgery for chronic rhinosinusitis.  She does not feel that the medical interventions have helped her voice.  She reports occasional difficulty swallowing dry foods.  She denies difficulties with cough/throat clearing and breathing.  Please see chart for additional PMH.   Prior Level of Functioning Same problem relapsing/remitting.   Patient Role/employment History Retired   General Observations Pt reports that today is a typical voice day.   Patient/family Goals To have a normal voice   Evaluation Results   Voice Observations VISIBLE " TENSION: neck.  PALPATION OF THYROHYOID REGION: firm musculature with closure of the thyrohyoid space on phonation.  Pt reporting tenderness in this region, L>R.   Voice Profile during conversation, 1 min monologue and paragraph reading   Voice Quality Raspy;Airy   Voice quality comments SPEECH: Consistent mild breathiness with frequent intermittent mild-moderate roughness, intermittent mild-moderate strain, and occasional pitch breaks.  SINGING: Consistent mild-moderate breathiness with increased strain especially at higher pitches and rare intermittent mild roughness.  THERAPY PROBES: improved voice quality with flow mode, forward resonance, semi-occluded vocal tract, and semi-occluded vocal tract probes.   Voice quality severity rating continuum (1=Severe, 7=WNL) 5  (CAPE-V Overall Severity: 33/100)   Breath control Tight   Breath Control comments Clavicular muscle use pattern with neck involvement observed on inspirations for speech.  Inspirations for speech are shallow with poor respiratory/phonatory coordination.   Breath control severity rating continuum (1=Severe, 7=WNL) 5   Voice Use / Effort Pinched / squeezed larynx;Contraction of neck muscles;Throat push   Voice Use / Effort comments Pt rates her current phonatory effort for speech as 2/10 (10 is maximum effort).   Voice use / Effort severity rating continuum (1=Severe, 7=WNL) 5   Fundamental frequency (Hz)   (Centered around Bb3)   Pitch /Frequency Description Pitch breaks   Pitch / Frequency comments Habitual pitch may be slightly higher than optimal, but pitch range is reduced overall.  Occasional pitch breaks observed in speech, especially with inflections to a higher pitch.   Pitch / Frequency severity rating continuum (1=Severe, 7=WNL) 5   Volume comments Volume for conversational speech is WFL and appropriate for the setting (1:1 conversation in quiet room).  A whisper is normal.  Soft phonation is mildly rough.  Loud phonation is mildly strained,  "with good volume increase.   Volume severity rating continuum (1=Severe, 7=WNL) 6   Neuromuscular Control WNL   Neuromuscular Control severity rating continuum (1=Severe, 7=WNL) 7   Resonance WNL   Resonance severity rating continuum (1=Severe, 7=WNL) 7   Comments Mild-moderate dysphonia in speech and singing modes characterized by roughness, breathiness, strain, pitch/phonation breaks, poor respiratory/phonatory coordination, reduced pitch range with increased dysphonia at higher pitches, and tight laryngeal musculature with neck involvement during phonation.   Adduction /Abduction Function   Laryngeal diadokinetic speed   (Mildly slow)   Laryngeal diadokinetic strength   (Strained)   Laryngeal diadokinetic consistency Regular   Adduction / Abduction function scale Age 66+, norm per sec:  4   Vibratory Function of Vocal Folds   Prolonged 'ah' at mid pitch (sec) 8.5 seconds at D4 with mild breathiness and a single pitch break   Prolonged 'ah' at high pitch (sec) 11.2 seconds at F#4 with mild breathiness and brief phonation breaks at the end of prolongation   Prolonged 'ah' at low pitch (sec) 9.8 seconds at F#3 with intermittent mild roughness and pitch instability   Vibratory Function of Vocal Folds Scale Females 20 - 80 yrs: 10 - 22 secs   Vibratory Function of Vocal Folds Comments Reduced in duration and quality   Function of Lengtheners / Shorteners (CT and TA Muscles)   Pitch glides Limited range;Upper pitches more dysphonic  (Lowest: D3; Highest: E5 w/ strain and phonation breaks)   Videostroboscopy / Endoscopy   Other observations Most recently completed by Dr. Collins on 12/8/21.  Per referral note: \"secondary dysphonia with vocal cord glottic gap present on 4/29/19, not visualized on examination on 8/31/20 due to laryngeal edema, with muscle tension dysphonia noted on fiberoptic laryngoscopy today.\"   General Therapy Interventions   Planned Therapy Interventions Voice   Voice Breath flow to sound flow;Voice " quality/pitch or volume tasks;Resonant voice techniques   Impressions and Recommendations   Communication Diagnosis Dysphonia   Summary Ms. Scruggs presents with mild-moderate dysphonia in speech and singing modes characterized by roughness, breathiness, strain, pitch/phonation breaks, poor respiratory/phonatory coordination, reduced pitch range with increased dysphonia at higher pitches, and tight laryngeal musculature with neck involvement during phonation.  Based on today's evaluation and previous evaluations with ENT, dysphonia is primarily accounted for by hyperfunction and/or imbalance in function of the intrinsic and extrinsic laryngeal musculature consistent with muscle tension dysphonia in the context of laryngeal irritation from GERD and chronic rhinosinusitis.   Recommendations A course of skilled speech therapy is recommended in order to optimize vocal technique, improve voice quality for speech and singing, and promote reduced laryngeal effort, fatigue, discomfort, and irritation, so that patient is able to meet her speech and singing demands.   Frequency and Duration 1x/week for 6 weeks with 2-3 monthly follow-ups   Prognosis  Good with intervention   Risks and Benefits of Treatment have been explained. Yes   Patient & /or Caregiver  in agreement with plan of care Yes   Patient Education SLP provided education regarding evaluation findings and proposed POC.  Therapy initiated today.   Educational Assessment   Barriers to Learning No barriers   Preferred Learning Style Listening;Reading;Demonstration;Pictures / Video   Voice Goals   Voice Goals 1;2;3;4   Voice Goal 1   Goal Identifier Generalization   Goal Description Patient will report a week of typical activities in which dysphonia and vocal fatigue do not exceed a level of 2 out of 10, 90% of the time, so that patient is able to meet her vocal demands for speech and singing.   Target Date 03/22/22   Voice Goal 2   Goal Identifier Speaking voice  quality   Goal Description In a 20-minute speech task, patient will demonstrate roughness, breathiness, strain, and pitch breaks that do not exceed a level of 2 out of 10, 90% of the time by SLP judgment, so that patient is able to meet her speaking voice quality demands.   Target Date 03/22/22   Voice Goal 3   Goal Identifier Singing voice quality   Goal Description In a 10-minute singing task, patient will demonstrate ability to phonate to G5 with roughness, breathiness, strain, and pitch/phonation breaks that do not exceed a level of 2 out of 10, 90% of the time by SLP judgment, in order to improve voice quality and range for singing tasks.   Target Date 03/22/22   Voice Goal 4   Goal Identifier Massage   Goal Description Patient will learn, demonstrate, and implement use of circumlaryngeal massage exercises independently 1-2x per day, every other day, in order to promote reduced laryngeal discomfort and tension.   Target Date 03/22/22   Total Session Time   Voice Minutes (10966) 30   Total Evaluation Time 45   Therapy Certification   Certification date from 12/22/21   Certification date to 03/22/22   Medical Diagnosis Muscle tension dysphonia     Thank you for the referral of this patient.    Allison Alpers Ackmann, B.A. (music) MUVALDO., CCC-SLP  Speech-Language Pathologist  Certificate of Vocology  [facility]  [phone]

## 2022-01-19 ENCOUNTER — HOSPITAL ENCOUNTER (OUTPATIENT)
Dept: SPEECH THERAPY | Facility: CLINIC | Age: 72
Setting detail: THERAPIES SERIES
End: 2022-01-19
Attending: STUDENT IN AN ORGANIZED HEALTH CARE EDUCATION/TRAINING PROGRAM
Payer: COMMERCIAL

## 2022-01-19 PROCEDURE — 92507 TX SP LANG VOICE COMM INDIV: CPT | Mod: GN | Performed by: STUDENT IN AN ORGANIZED HEALTH CARE EDUCATION/TRAINING PROGRAM

## 2022-01-26 ENCOUNTER — HOSPITAL ENCOUNTER (OUTPATIENT)
Dept: SPEECH THERAPY | Facility: CLINIC | Age: 72
Setting detail: THERAPIES SERIES
End: 2022-01-26
Attending: STUDENT IN AN ORGANIZED HEALTH CARE EDUCATION/TRAINING PROGRAM
Payer: COMMERCIAL

## 2022-01-26 DIAGNOSIS — R49.0 DYSPHONIA: Primary | ICD-10-CM

## 2022-01-26 DIAGNOSIS — R49.0 MUSCLE TENSION DYSPHONIA: ICD-10-CM

## 2022-01-26 PROCEDURE — 92507 TX SP LANG VOICE COMM INDIV: CPT | Mod: GN | Performed by: STUDENT IN AN ORGANIZED HEALTH CARE EDUCATION/TRAINING PROGRAM

## 2022-01-26 NOTE — ADDENDUM NOTE
Encounter addended by: Cristiana Sands, SLP on: 1/26/2022 11:06 AM   Actions taken: Charge Capture section accepted

## 2022-01-26 NOTE — ADDENDUM NOTE
Encounter addended by: Cristiana Sands, SLP on: 1/26/2022 11:18 AM   Actions taken: Visit diagnoses modified

## 2022-01-26 NOTE — ADDENDUM NOTE
Encounter addended by: Cristiana aSnds, SLP on: 1/26/2022 11:09 AM   Actions taken: Visit diagnoses modified

## 2022-01-26 NOTE — ADDENDUM NOTE
Encounter addended by: Cristiana Sands, SLP on: 1/26/2022 11:08 AM   Actions taken: Flowsheet accepted

## 2022-02-02 ENCOUNTER — HOSPITAL ENCOUNTER (OUTPATIENT)
Dept: SPEECH THERAPY | Facility: CLINIC | Age: 72
Setting detail: THERAPIES SERIES
End: 2022-02-02
Attending: STUDENT IN AN ORGANIZED HEALTH CARE EDUCATION/TRAINING PROGRAM
Payer: COMMERCIAL

## 2022-02-02 DIAGNOSIS — R49.0 MUSCLE TENSION DYSPHONIA: ICD-10-CM

## 2022-02-02 DIAGNOSIS — R49.0 DYSPHONIA: Primary | ICD-10-CM

## 2022-02-02 PROCEDURE — 92507 TX SP LANG VOICE COMM INDIV: CPT | Mod: GN | Performed by: STUDENT IN AN ORGANIZED HEALTH CARE EDUCATION/TRAINING PROGRAM

## 2022-07-17 ENCOUNTER — HEALTH MAINTENANCE LETTER (OUTPATIENT)
Age: 72
End: 2022-07-17

## 2022-09-25 ENCOUNTER — HEALTH MAINTENANCE LETTER (OUTPATIENT)
Age: 72
End: 2022-09-25

## 2023-05-03 ENCOUNTER — MEDICAL CORRESPONDENCE (OUTPATIENT)
Dept: HEALTH INFORMATION MANAGEMENT | Facility: CLINIC | Age: 73
End: 2023-05-03
Payer: COMMERCIAL

## 2023-05-03 DIAGNOSIS — I10 HTN (HYPERTENSION): ICD-10-CM

## 2023-05-03 DIAGNOSIS — R09.02 HYPOXEMIA: Primary | ICD-10-CM

## 2023-05-03 DIAGNOSIS — E78.2 MIXED HYPERLIPIDEMIA: ICD-10-CM

## 2023-05-17 ENCOUNTER — ANCILLARY PROCEDURE (OUTPATIENT)
Dept: GENERAL RADIOLOGY | Facility: CLINIC | Age: 73
End: 2023-05-17
Attending: INTERNAL MEDICINE
Payer: COMMERCIAL

## 2023-05-17 DIAGNOSIS — R09.02 HYPOXEMIA: ICD-10-CM

## 2023-05-17 PROCEDURE — 71046 X-RAY EXAM CHEST 2 VIEWS: CPT

## 2023-05-24 ENCOUNTER — HOSPITAL ENCOUNTER (OUTPATIENT)
Dept: CARDIOLOGY | Facility: CLINIC | Age: 73
Discharge: HOME | End: 2023-05-24
Attending: INTERNAL MEDICINE | Admitting: INTERNAL MEDICINE
Payer: COMMERCIAL

## 2023-05-24 DIAGNOSIS — E78.2 MIXED HYPERLIPIDEMIA: ICD-10-CM

## 2023-05-24 DIAGNOSIS — R09.02 HYPOXEMIA: ICD-10-CM

## 2023-05-24 DIAGNOSIS — I10 HTN (HYPERTENSION): ICD-10-CM

## 2023-05-24 LAB — LVEF ECHO: NORMAL

## 2023-05-24 PROCEDURE — 999N000208 ECHOCARDIOGRAM COMPLETE

## 2023-05-24 PROCEDURE — 93306 TTE W/DOPPLER COMPLETE: CPT | Mod: 26 | Performed by: INTERNAL MEDICINE

## 2023-05-24 PROCEDURE — 255N000002 HC RX 255 OP 636: Performed by: INTERNAL MEDICINE

## 2023-05-24 RX ADMIN — HUMAN ALBUMIN MICROSPHERES AND PERFLUTREN 9 ML: 10; .22 INJECTION, SOLUTION INTRAVENOUS at 12:55

## 2024-03-02 ENCOUNTER — HEALTH MAINTENANCE LETTER (OUTPATIENT)
Age: 74
End: 2024-03-02

## 2024-12-07 ENCOUNTER — HEALTH MAINTENANCE LETTER (OUTPATIENT)
Age: 74
End: 2024-12-07